# Patient Record
Sex: MALE | Race: WHITE | NOT HISPANIC OR LATINO | Employment: OTHER | URBAN - METROPOLITAN AREA
[De-identification: names, ages, dates, MRNs, and addresses within clinical notes are randomized per-mention and may not be internally consistent; named-entity substitution may affect disease eponyms.]

---

## 2018-09-12 ENCOUNTER — OFFICE VISIT (OUTPATIENT)
Dept: VASCULAR SURGERY | Facility: CLINIC | Age: 63
End: 2018-09-12
Payer: MEDICARE

## 2018-09-12 VITALS
WEIGHT: 135 LBS | DIASTOLIC BLOOD PRESSURE: 82 MMHG | BODY MASS INDEX: 23.92 KG/M2 | SYSTOLIC BLOOD PRESSURE: 128 MMHG | TEMPERATURE: 97.8 F | HEIGHT: 63 IN

## 2018-09-12 DIAGNOSIS — I77.9 PAOD (PERIPHERAL ARTERIAL OCCLUSIVE DISEASE) (HCC): Primary | ICD-10-CM

## 2018-09-12 PROBLEM — F17.200 TOBACCO DEPENDENCE: Status: ACTIVE | Noted: 2018-09-12

## 2018-09-12 PROCEDURE — 99204 OFFICE O/P NEW MOD 45 MIN: CPT | Performed by: SURGERY

## 2018-09-12 RX ORDER — ATORVASTATIN CALCIUM 20 MG/1
20 TABLET, FILM COATED ORAL DAILY
Qty: 60 TABLET | Refills: 3 | Status: SHIPPED | OUTPATIENT
Start: 2018-09-12

## 2018-09-12 RX ORDER — ASPIRIN 81 MG/1
81 TABLET ORAL DAILY
Qty: 90 TABLET | Refills: 5 | Status: SHIPPED | OUTPATIENT
Start: 2018-09-12

## 2018-09-12 NOTE — PROGRESS NOTES
PAOD (peripheral arterial occlusive disease) ([facility] [address] )  Alexa Blue is a 24-year-old long-time smoker who presents to the office at the request of his podiatrist for bilateral lower extremity claudication type symptoms  He states he is able to ambulate approximately 1-2 blocks  The right leg is worse than the left  He denies any rest pain  He comes to the office with no noninvasive studies  Plan: Aortoiliac and lower extremity arterial duplex with return office visit to discuss results and potential treatment options if indicated  -aspirin 81 milligrams daily  -atorvastatin 20 milligrams daily    Tobacco dependence  -counseled on the ill effects of continued smoking  Patient on interested in quitting at present time  Assessment/Plan   Diagnoses and all orders for this visit:    PAOD (peripheral arterial occlusive disease) ([facility])  -     VAS lower limb arterial duplex, complete bilateral; Future  -     VAS abdominal aorta/iliacs; complete study; Future  -     aspirin (ECOTRIN LOW STRENGTH) 81 mg EC tablet; Take 1 tablet (81 mg total) by mouth daily  -     atorvastatin (LIPITOR) 20 mg tablet; Take 1 tablet (20 mg total) by mouth daily        No chief complaint on file  Subjective   Patient ID: Idalims Funk is a 58 y o  male  Chief complaint :pt is new to our practice  Pt states he has cramping pain to bilateral legs R>L  Pt states he can walk a block or two before stopping  Pt states he has numbness and tingling to right foot  Pt denies open wounds or sores  Alexa Blue is a 24-year-old gentleman referred to our office for bilateral lower extremity lifestyle limiting claudication  He is only able to ambulate approximately 1-2 blocks  His right leg is worse than left  He smokes approximately 1 pack cigarettes per day  He denies any rest pain  There is no tissue loss  He presents to the office with no noninvasive imaging studies          The following portions of the patient's history were reviewed and updated as appropriate: allergies, current medications, past family history, past medical history, past social history, past surgical history and problem list     Review of Systems   Constitutional: Positive for chills and diaphoresis  HENT: Positive for ear discharge, mouth sores and postnasal drip  Eyes: Negative  Respiratory: Positive for cough and wheezing  Cardiovascular: Positive for leg swelling  Painful veins   Gastrointestinal: Negative  Endocrine: Negative  Genitourinary: Negative  Musculoskeletal: Positive for back pain, gait problem, joint swelling, myalgias, neck pain and neck stiffness  Leg pain  Leg cramping     Skin: Positive for color change and rash  Allergic/Immunologic: Negative  Hematological: Negative  Psychiatric/Behavioral: Negative  Patient Active Problem List   Diagnosis    PAOD (peripheral arterial occlusive disease) ([facility] [address] )    Tobacco dependence       Past Surgical History:   Procedure Laterality Date    SHOULDER SURGERY         Family History   Problem Relation Age of Onset    No Known Problems Mother     No Known Problems Father        Social History     Social History    Marital status:      Spouse name: N/A    Number of children: N/A    Years of education: N/A     Occupational History    Not on file       Social History Main Topics    Smoking status: Current Every Day Smoker    Smokeless tobacco: Former User    Alcohol use Not on file    Drug use: Unknown    Sexual activity: Not on file     Other Topics Concern    Not on file     Social History Narrative    No narrative on file       No Known Allergies      Current Outpatient Prescriptions:     aspirin (ECOTRIN LOW STRENGTH) 81 mg EC tablet, Take 1 tablet (81 mg total) by mouth daily, Disp: 90 tablet, Rfl: 5    atorvastatin (LIPITOR) 20 mg tablet, Take 1 tablet (20 mg total) by mouth daily, Disp: 60 tablet, Rfl: 3    Objective     Physical Exam:    General appearance: alert and oriented, in no acute distress  Skin: Bilateral feet ruborous in appearance  Neurologic: Grossly normal  Head: Normocephalic, without obvious abnormality, atraumatic  Eyes: conjunctivae/corneas clear    Neck: no carotid bruit, no JVD and supple, symmetrical, trachea midline  Back: negative  Lungs: clear to auscultation bilaterally  Chest wall: no tenderness  Heart: S1, S2 normal  Abdomen: soft, non-tender; bowel sounds normal; no masses,  no organomegaly and Well-healed midline laparotomy incision  Extremities: paucity of lower leg hair growth    Pulse exam:  Radial: Right: 2+ Left[de-identified] 2+   Femoral R 1+, L nonpalp  Popliteal: Right: non-palpable Left: non-palpable  DP: Right: non-palpable Left: non-palpable  PT: Right: non-palpable Left: non-palpable

## 2018-09-12 NOTE — ASSESSMENT & PLAN NOTE
-counseled on the ill effects of continued smoking  Patient on interested in quitting at present time

## 2018-09-12 NOTE — ASSESSMENT & PLAN NOTE
Viri Velasquez is a 77-year-old long-time smoker who presents to the office at the request of his podiatrist for bilateral lower extremity claudication type symptoms  He states he is able to ambulate approximately 1-2 blocks  The right leg is worse than the left  He denies any rest pain  He comes to the office with no noninvasive studies  Plan: Aortoiliac and lower extremity arterial duplex with return office visit to discuss results and potential treatment options if indicated

## 2018-09-12 NOTE — PATIENT INSTRUCTIONS
Peripheral Artery Disease   AMBULATORY CARE:   Peripheral artery disease (PAD)  is narrow, weak, or blocked arteries  It may affect any arteries outside of your heart and brain  PAD is usually the result of a buildup of fat and cholesterol, also called plaque, along your artery walls  Inflammation, a blood clot, or abnormal cell growth could also block your arteries  PAD prevents normal blood flow to your legs and arms  You are at risk of an amputation if poor blood flow keeps wounds from healing or causes gangrene (tissue death)  Without treatment, PAD can also cause a heart attack or stroke  Common symptoms include:  Mild PAD usually does not cause symptoms  As the disease worsens over time, you may have the following:  · Pain or cramps in your leg or hip while you walk     · A numb, weak, or heavy feeling in your legs     · Dry, scaly, red, or pale skin on your legs     · Thick or brittle nails, or hair loss on your arms and legs     · Foot sores that will not heal     · Burning or aching in your feet and toes while resting (this may be worse when you lie down)  Call 911 for the following:   · You have any of the following signs of a heart attack:      ¨ Squeezing, pressure, or pain in your chest that lasts longer than 5 minutes or returns    ¨ Discomfort or pain in your back, neck, jaw, stomach, or arm     ¨ Trouble breathing    ¨ Nausea or vomiting    ¨ Lightheadedness or a sudden cold sweat, especially with chest pain or trouble breathing    · You have any of the following signs of a stroke:      ¨ Numbness or drooping on one side of your face     ¨ Weakness in an arm or leg    ¨ Confusion or difficulty speaking    ¨ Dizziness, a severe headache, or vision loss  Seek care immediately if:   · You have sores or wounds that will not heal      · You notice black or discolored skin on your arm or leg  · Your skin is cool to the touch    Contact your healthcare provider if:   · You have leg pain when you walk 1/8 mile (200 meters) or less, even with treatment  · Your legs are red, dry, or pale, even with treatment  · You have questions or concerns about your condition or care  Treatment for PAD  can help reduce your risk of a heart attack, stroke, or amputation  You may need more than one of the following:  · Medicines  may be given to prevent blood clots and reduce the risk of a heart attack or stroke  You may be given medicine to help prevent your PAD from getting worse  · A supervised exercise program  helps you stay active in normal daily activities and may prevent disability  Healthcare providers will help you safely walk or do strength training exercises 3 times a week for 30 to 60 minutes  You will do this for several months, then transition to walking on your own  · Angioplasty  is a procedure to open your artery so blood can flow through normally  A thin tube called a catheter is used to insert a small balloon into your artery  The balloon is inflated to open your blocked artery, and then removed  A tube called a stent may be placed in your artery to hold it open  · Bypass surgery  is used to make a new connection to your artery with a vein from another part of your body, or an artificial graft  The vein or graft is attached to your artery above and below your blockage  This allows blood to flow around the blocked portion of your artery  Manage and prevent PAD:   · Walk for 30 to 60 minutes at least 4 times a week  Your healthcare provider may also refer you to an supervised exercise program  The program helps increase how far you can walk without pain  It also helps you stay active in normal daily activities and may prevent disability caused by PAD  · Do not smoke  Nicotine and other chemicals in cigarettes and cigars can worsen PAD  They can also increase your risk for a heart attack or stroke  Ask your healthcare provider for information if you currently smoke and need help to quit  E-cigarettes or smokeless tobacco still contain nicotine  Talk to your healthcare provider before you use these products  · Manage other health conditions  Take your medicines as directed and follow your healthcare provider's instructions if you have high blood pressure or high cholesterol  Perform foot care and check your blood sugar levels as directed if you have diabetes  · Eat heart healthy foods  Eat whole grains, fruits, and vegetables every day  Limit salt and high-fat foods  Ask your healthcare provider for more information on a heart healthy diet  Ask if you need to lose weight  Your healthcare provider can help you create a healthy weight-loss plan  Follow up with your healthcare provider as directed:  Write down your questions so you remember to ask them during your visits  © 2017 [facility] [employer] Information is for End User's use only and may not be sold, redistributed or otherwise used for commercial purposes  All illustrations and images included in CareNotes® are the copyrighted property of [employer] D [employer] M [employer] Inc  or Vincenzo Mariano  The above information is an  only  It is not intended as medical advice for individual conditions or treatments  Talk to your doctor, nurse or pharmacist before following any medical regimen to see if it is safe and effective for you

## 2018-11-07 ENCOUNTER — HOSPITAL ENCOUNTER (OUTPATIENT)
Dept: NON INVASIVE DIAGNOSTICS | Facility: CLINIC | Age: 63
Discharge: HOME | End: 2018-11-07
Payer: MEDICARE

## 2018-11-07 DIAGNOSIS — I77.9 PAOD (PERIPHERAL ARTERIAL OCCLUSIVE DISEASE) (HCC): ICD-10-CM

## 2018-11-07 PROCEDURE — 93923 UPR/LXTR ART STDY 3+ LVLS: CPT

## 2018-11-07 PROCEDURE — 93978 VASCULAR STUDY: CPT

## 2018-11-07 PROCEDURE — 93925 LOWER EXTREMITY STUDY: CPT

## 2018-11-08 PROCEDURE — 93925 LOWER EXTREMITY STUDY: CPT | Performed by: SURGERY

## 2018-11-08 PROCEDURE — 93978 VASCULAR STUDY: CPT | Performed by: SURGERY

## 2018-11-08 PROCEDURE — 93922 UPR/L XTREMITY ART 2 LEVELS: CPT | Performed by: SURGERY

## 2018-11-13 NOTE — PROGRESS NOTES
Assessment/Plan:    [facility]  Short distance bilateral lower extremity lifestyle limiting claudication  Long-time smoker 1 pack cigarettes per day  Noninvasive arterial imaging suggest left external iliac stenosis greater than 75%; bilateral SFA occlusions with right distal common femoral artery high-grade stenosis  Discussed the pathophysiology of peripheral arterial occlusive disease in the ill effects of continued smoking  Patient has no desire to quit at present time  Plan CTA of the abdomen pelvis with bilateral lower extremity runoff  Abdominal aortogram with possible left external iliac angioplasty/stent, right common femoral endarterectomy, possible right SFA recanalization  Cardiac risk assessment  Diagnoses and all orders for this visit:    PAOD (peripheral arterial occlusive disease) ([email or web address] )  -     Case request operating room: ENDARTERECTOMY ARTERIAL FEMORAL  Abdominal aortogram with possible left external iliac angioplasty/stent, right SFA recanalization; Standing  -     Type and screen; Future  -     Basic metabolic panel; Future  -     CBC and Platelet; Future  -     HEMOGLOBIN A1C W/ EAG ESTIMATION; Future  -     EKG 12 lead; Future  -     XR chest pa & lateral; Future  -     Ambulatory referral to Cardiology; Future  -     Case request operating room: ENDARTERECTOMY ARTERIAL FEMORAL  Abdominal aortogram with possible left external iliac angioplasty/stent, right SFA recanalization  -     CTA abdominal w run off wo contrast; Future    Tobacco dependence    Other orders  -     Diet NPO; Sips with meds; Standing  -     Void on call to OR; Standing  -     Insert peripheral IV; Standing  -     Place sequential compression device; Standing  -     Insert urinary catheter (In Operative Area Only);  Standing  -     ceFAZolin (ANCEF) IVPB (premix) 1,000 mg; Infuse 50 mL (1,000 mg total) into a venous catheter once   -     chlorhexidine (PERIDEX) 0 12 % oral rinse 15 mL; Swish and spit 15 mL every 12 (twelve) hours                Patient ID: Da Wasserman is a 58 y o  male  Chief complaint: Pt is here to review AOIL and ENRIQUE done 11/7/18  Pt is c/o bilateral cramping pain to legs R>L  Pt states he can walk a block or two before stopping  Pt states he has numbness and tingling to right foot  No open wounds or sores  Pt is smoking a pack a day  Pt is on asa and statin  Gene Sanon returns to the office following noninvasive arterial imaging  He was previously seen with complaints of 1-2 blocks bilateral lower extremity lifestyle limiting claudication (right leg worse than left leg)  He continues smoke 1 pack of cigarettes per day  He reports that the pain has adversely affected his overall quality of life  He is interested in pursuing treatment  The following portions of the patient's history were reviewed and updated as appropriate: allergies, current medications, past family history, past medical history, past social history, past surgical history and problem list     Review of Systems   Constitutional: Positive for chills and fever  HENT: Positive for sore throat  Eyes: Negative  Respiratory: Positive for choking, shortness of breath and wheezing  Cardiovascular: Positive for chest pain  Gastrointestinal: Negative  Endocrine: Negative  Genitourinary: Negative  Musculoskeletal: Negative  Leg pain   Leg cramping   Skin: Negative  Allergic/Immunologic: Negative  Neurological: Positive for dizziness, weakness, light-headedness and numbness  Hematological: Bruises/bleeds easily  Psychiatric/Behavioral: Negative  I have personally reviewed the ROS entered by MA and agree as documented      Objective:      /86 (BP Location: Right arm, Patient Position: Sitting, Cuff Size: Adult)   Temp (!) 96 6 °F (35 9 °C) (Tympanic)   Ht 5' 3" (1 6 m)   Wt 63 5 kg (140 lb)   BMI 24 80 kg/m²          Physical Exam Constitutional: He is oriented to person, place, and time  He appears well-developed and well-nourished  No distress  Looks much older than stated age   HENT:   Head: Normocephalic and atraumatic  Eyes: Conjunctivae and EOM are normal  No scleral icterus  Neck: Normal range of motion  Neck supple  No JVD present  No tracheal deviation present  Cardiovascular: Normal rate, regular rhythm and normal heart sounds  Pulses:       Radial pulses are 2+ on the right side, and 2+ on the left side  Femoral pulses are 1+ on the right side, and 1+ on the left side  Popliteal pulses are 0 on the right side, and 0 on the left side  Dorsalis pedis pulses are 0 on the right side, and 0 on the left side  Posterior tibial pulses are 0 on the right side, and 0 on the left side  Pulmonary/Chest: Effort normal and breath sounds normal    Abdominal: Soft  He exhibits no distension  Mass: no appreciable aortic pulstion/aneurysm  There is no tenderness  There is no rebound and no guarding  Well-healed midline/abdominal incisions/scars from prior surgeries   Musculoskeletal: Normal range of motion  He exhibits no edema  Neurological: He is alert and oriented to person, place, and time  Skin: Skin is warm and dry  There is erythema  Psychiatric: He has a normal mood and affect  His behavior is normal  Judgment and thought content normal          Imaging:  I have reviewed the imaging and the findings are:   Lower extremity arterial duplex  CONCLUSION:     Impression:  RIGHT LOWER LIMB:  The common femoral artery has monophasic waveform, suggestive of more proximal  disease  There is a >75% stenosis at the confluence of the common femoral artery and  profunda femoris artery  There is occlusion of the superficial femoral artery, with reconstitution in  the popliteal artery  Severe atherosclerotic arterial disease is noted throughout the femoropopliteal  vessels    Findings suggestive of tibioperoneal disease  Ankle/Brachial index:  74, moderate claudication range  May be unreliable due  to calcification of vessels  PVR/ PPG tracings are dampened  Metatarsal pressure/Great Toe Pressure: Unobtainable due to flat-line ppg  waveform tracings  LEFT LOWER LIMB:  The common femoral waveform is monophasic, suggestive of more proximal disease  There is occlusion of the proximal and mid superficial femoral artery, with  distal reconstitution  Severe atherosclerotic arterial disease throughout the femoropopliteal vessels  Findings suggestive of tibioperoneal diease  Ankle/Brachial index:  68, moderate claudication range  May be unreliable due  to calcification of vessels  PVR/ PPG tracings are dampened  Metatarsal pressure of  79 mm Hg  Great toe pressure of 57 mm Hg    Aortoiliac duplex:  CONCLUSION:     Impression     TECHNICALLY LIMITED STUDY     The visualized mid and distal abdominal aorta appear patent and normal in  caliber, with severe diffuse atherosclerotic arterial disease throughout  The visualized portions of the right common and external iliac arteries are  patent and normal in caliber, with severe diffuse atherosclerotic arterial  disease throughout  There is a >75% stenosis in the left distal external iliac artery  The  visualized portions of the common iliac artery are patent, with severe diffuse  atherosclerotic arterial disease throughout  Tech Note: This study was very technically difficult/limited due to severe  vessel calcifications, and abundant overlying bowel gas  The proximal aorta and  vessels could not be identified for examination

## 2018-11-14 ENCOUNTER — TELEPHONE (OUTPATIENT)
Dept: VASCULAR SURGERY | Facility: CLINIC | Age: 63
End: 2018-11-14

## 2018-11-14 ENCOUNTER — OFFICE VISIT (OUTPATIENT)
Dept: VASCULAR SURGERY | Facility: CLINIC | Age: 63
End: 2018-11-14
Payer: MEDICARE

## 2018-11-14 ENCOUNTER — APPOINTMENT (OUTPATIENT)
Dept: LAB | Facility: OTHER | Age: 63
End: 2018-11-14
Payer: MEDICARE

## 2018-11-14 ENCOUNTER — TRANSCRIBE ORDERS (OUTPATIENT)
Dept: LAB | Facility: OTHER | Age: 63
End: 2018-11-14

## 2018-11-14 VITALS
SYSTOLIC BLOOD PRESSURE: 130 MMHG | HEIGHT: 63 IN | TEMPERATURE: 96.6 F | BODY MASS INDEX: 24.8 KG/M2 | DIASTOLIC BLOOD PRESSURE: 86 MMHG | WEIGHT: 140 LBS

## 2018-11-14 DIAGNOSIS — I77.9 PAOD (PERIPHERAL ARTERIAL OCCLUSIVE DISEASE) (HCC): Primary | ICD-10-CM

## 2018-11-14 DIAGNOSIS — F17.200 TOBACCO DEPENDENCE: ICD-10-CM

## 2018-11-14 DIAGNOSIS — I77.9 PAOD (PERIPHERAL ARTERIAL OCCLUSIVE DISEASE) (HCC): ICD-10-CM

## 2018-11-14 LAB
ABO GROUP BLD: NORMAL
ANION GAP SERPL CALCULATED.3IONS-SCNC: 4 MMOL/L (ref 4–13)
BLD GP AB SCN SERPL QL: NEGATIVE
BUN SERPL-MCNC: 15 MG/DL (ref 5–25)
CALCIUM SERPL-MCNC: 8.4 MG/DL (ref 8.3–10.1)
CHLORIDE SERPL-SCNC: 105 MMOL/L (ref 100–108)
CO2 SERPL-SCNC: 25 MMOL/L (ref 21–32)
CREAT SERPL-MCNC: 0.97 MG/DL (ref 0.6–1.3)
ERYTHROCYTE [DISTWIDTH] IN BLOOD BY AUTOMATED COUNT: 14.2 % (ref 11.6–15.1)
EST. AVERAGE GLUCOSE BLD GHB EST-MCNC: 111 MG/DL
GFR SERPL CREATININE-BSD FRML MDRD: 83 ML/MIN/1.73SQ M
GLUCOSE P FAST SERPL-MCNC: 96 MG/DL (ref 65–99)
HBA1C MFR BLD: 5.5 % (ref 4.2–6.3)
HCT VFR BLD AUTO: 52.1 % (ref 36.5–49.3)
HGB BLD-MCNC: 16.2 G/DL (ref 12–17)
MCH RBC QN AUTO: 33.2 PG (ref 26.8–34.3)
MCHC RBC AUTO-ENTMCNC: 31.1 G/DL (ref 31.4–37.4)
MCV RBC AUTO: 107 FL (ref 82–98)
PLATELET # BLD AUTO: 125 THOUSANDS/UL (ref 149–390)
PMV BLD AUTO: 12.3 FL (ref 8.9–12.7)
POTASSIUM SERPL-SCNC: 5.3 MMOL/L (ref 3.5–5.3)
RBC # BLD AUTO: 4.88 MILLION/UL (ref 3.88–5.62)
RH BLD: POSITIVE
SODIUM SERPL-SCNC: 134 MMOL/L (ref 136–145)
SPECIMEN EXPIRATION DATE: NORMAL
WBC # BLD AUTO: 9.09 THOUSAND/UL (ref 4.31–10.16)

## 2018-11-14 PROCEDURE — 86850 RBC ANTIBODY SCREEN: CPT

## 2018-11-14 PROCEDURE — 83036 HEMOGLOBIN GLYCOSYLATED A1C: CPT

## 2018-11-14 PROCEDURE — 85027 COMPLETE CBC AUTOMATED: CPT

## 2018-11-14 PROCEDURE — 80048 BASIC METABOLIC PNL TOTAL CA: CPT

## 2018-11-14 PROCEDURE — 86901 BLOOD TYPING SEROLOGIC RH(D): CPT

## 2018-11-14 PROCEDURE — 86900 BLOOD TYPING SEROLOGIC ABO: CPT

## 2018-11-14 PROCEDURE — 36415 COLL VENOUS BLD VENIPUNCTURE: CPT

## 2018-11-14 PROCEDURE — 99214 OFFICE O/P EST MOD 30 MIN: CPT | Performed by: SURGERY

## 2018-11-14 RX ORDER — CHLORHEXIDINE GLUCONATE 0.12 MG/ML
15 RINSE ORAL EVERY 12 HOURS SCHEDULED
Status: CANCELLED | OUTPATIENT
Start: 2018-11-14

## 2018-11-14 NOTE — PATIENT INSTRUCTIONS
How to Stop Smoking   WHAT YOU NEED TO KNOW:   Why should I stop smoking? You will improve your health and the health of others around you if you stop smoking  Your risk for heart and lung disease, cancer, stroke, heart attack, and vision problems will also decrease  You can benefit from quitting no matter how long you have smoked  How can I prepare to stop smoking? Nicotine is a highly addictive drug found in cigarettes  Withdrawal symptoms can happen when you stop smoking and make it hard to quit  These include anxiety, depression, irritability, trouble sleeping, and increased appetite  You increase your chances of success if you prepare to quit  · Set a quit date  Elva Cline a date that is within the next 2 weeks  Do not pick a day that you think may be stressful or busy  Write down the day or [employer] it on your calender  · Tell friends and family that you plan to quit  Explain that you may have withdrawal symptoms when you try to quit  Ask them to support you  They may be able to encourage you and help reduce your stress to make it easier for you to quit  · Make a list of your reasons for quitting  Put the list somewhere you will see it every day, such as your refrigerator  You can look at the list when you have a craving  · Remove all tobacco and nicotine products from your home, car, and workplace  Also, remove anything else that will tempt you to smoke, such as lighters, matches, or ashtrays  Clean your car, home, and places at work that smell like smoke  The smell of smoke can trigger a craving  · Identify triggers that make you want to smoke  This may include activities, feelings, or people  Also write down 1 way you can deal with each of your triggers  For example, if you want to smoke as soon as you wake up, plan another activity during this time, such as exercise  · Make a plan for how you will quit    Learn about the tools that can help you quit, such as medicine, counseling, or nicotine replacement therapy  Choose at least 2 options to help you quit  What are some tools to help me stop smoking? · Counseling  from a trained healthcare provider can provide you with support and skills to quit smoking  The provider will also teach you to manage your withdrawal symptoms and cravings  You may receive counseling from one counselor, in group therapy, or through phone therapy called a quit line  · Nicotine replacement therapy (NRT)  such as nicotine patches, gum, or lozenges may help reduce your nicotine cravings  You may get these without a doctor's order  Do not use e-cigarettes or smokeless tobacco in place of cigarettes or to help you quit  They still contain nicotine  · Prescription medicines  such as nasal sprays or nicotine inhalers may help reduce your withdrawal symptoms  Other medicines may also be used to reduce your urge to smoke  Ask your healthcare provider about these medicines  You may need to start certain medicines 2 weeks before your quit date for them to work well  · Hypnosis  is a practice that helps guide you through thoughts and feelings  Hypnosis may help decrease your cravings and make you more willing to quit  · Acupuncture therapy  uses very thin needles to balance energy channels in the body  This is thought to help decrease cravings and symptoms of nicotine withdrawal      · Support groups  let you talk to others who are trying to quit or have already quit  It may be helpful to speak with others about how they quit  How can I manage my cravings? · Avoid situations, people, and places that tempt you to smoke  Go to nonsmoking places, such as libraries or restaurants  Understand what tempts you and try to avoid these things  · Keep your hands busy  Hold things such as a stress ball or pen  · Put candy or toothpicks in your mouth  Keep lollipops, sugarless gum, or toothpicks with you at all times  · Do not have alcohol or caffeine    These drinks may tempt you to smoke  Drink healthy liquids such as water or juice instead  · Reward yourself when you resist your cravings  Rewards will motivate you and help you stay positive  · Do an activity that distracts you from your craving  Examples include going for a walk, exercising, or cleaning  What should I know about weight gain after I quit? You may gain a few pounds after you quit smoking  It is healthier for you to gain a few pounds than to continue to smoke  The following can help you prevent weight gain:  · Eat healthy foods  These include fruits, vegetables, whole-grain breads, low-fat dairy products, beans, lean meats, and fish  Eat healthy snacks, such as low-fat yogurt, if you get hungry between meals  · Drink water before, during, and between meals  This will make your stomach feel full and help prevent you from overeating  Ask your healthcare provider how much liquid to drink each day and which liquids are best for you  · Exercise  Take a walk or do some kind of exercise every day  Ask your healthcare provider what exercise is right for you  This may help reduce your cravings and reduce stress  Where can I find support and more information? · [employer]  Phone: 9- 843 - [phone]  Web Address: www [employer]  CARE AGREEMENT:   You have the right to help plan your care  Learn about your health condition and how it may be treated  Discuss treatment options with your caregivers to decide what care you want to receive  You always have the right to refuse treatment  The above information is an  only  It is not intended as medical advice for individual conditions or treatments  Talk to your doctor, nurse or pharmacist before following any medical regimen to see if it is safe and effective for you  © 2017 [employer] Information is for End User's use only and may not be sold, redistributed or otherwise used for commercial purposes   All illustrations and images included in CareNotes® are the copyrighted property of A D A M , Inc  or Vincenzo Mariano

## 2018-11-14 NOTE — ASSESSMENT & PLAN NOTE
Short distance bilateral lower extremity lifestyle limiting claudication  Long-time smoker 1 pack cigarettes per day  Noninvasive arterial imaging suggest left external iliac stenosis greater than 75%; bilateral SFA occlusions with right distal common femoral artery high-grade stenosis  Discussed the pathophysiology of peripheral arterial occlusive disease in the ill effects of continued smoking  Patient has no desire to quit at present time  Plan CTA of the abdomen pelvis with bilateral lower extremity runoff  Abdominal aortogram with possible left external iliac angioplasty/stent, right common femoral endarterectomy, possible right SFA recanalization  Cardiac risk assessment

## 2018-11-14 NOTE — TELEPHONE ENCOUNTER
The patient was seen by Doctor Layo Malone on 11/14/18  The patient needs Cardiac Clearance for Abdominal aortogram with possible left external iliac angioplasty/stent, Right common femoral endarterectomy, Possible R SFA recanalization  I spoke with Sarah Larson and scheduled the patient for 11/16/18 at 3 pm to see Doctor Rylee Esteban  I placed the clearance form in their folder  Fax number is [phone]

## 2018-11-23 ENCOUNTER — HOSPITAL ENCOUNTER (OUTPATIENT)
Dept: RADIOLOGY | Facility: HOSPITAL | Age: 63
Discharge: HOME | End: 2018-11-23
Attending: SURGERY
Payer: MEDICARE

## 2018-11-23 ENCOUNTER — TRANSCRIBE ORDERS (OUTPATIENT)
Dept: LAB | Facility: HOSPITAL | Age: 63
End: 2018-11-23

## 2018-11-23 ENCOUNTER — HOSPITAL ENCOUNTER (OUTPATIENT)
Dept: CT IMAGING | Facility: HOSPITAL | Age: 63
Discharge: HOME | End: 2018-11-23
Attending: SURGERY
Payer: MEDICARE

## 2018-11-23 ENCOUNTER — TRANSCRIBE ORDERS (OUTPATIENT)
Dept: ADMINISTRATIVE | Facility: HOSPITAL | Age: 63
End: 2018-11-23

## 2018-11-23 DIAGNOSIS — I77.9 PAOD (PERIPHERAL ARTERIAL OCCLUSIVE DISEASE) (HCC): ICD-10-CM

## 2018-11-23 PROCEDURE — 75635 CT ANGIO ABDOMINAL ARTERIES: CPT

## 2018-11-23 PROCEDURE — 71046 X-RAY EXAM CHEST 2 VIEWS: CPT

## 2018-11-23 RX ADMIN — IOHEXOL 145 ML: 350 INJECTION, SOLUTION INTRAVENOUS at 17:19

## 2018-11-28 ENCOUNTER — OFFICE VISIT (OUTPATIENT)
Dept: LAB | Facility: HOSPITAL | Age: 63
End: 2018-11-28
Attending: SURGERY
Payer: MEDICARE

## 2018-11-28 DIAGNOSIS — I77.9 PAOD (PERIPHERAL ARTERIAL OCCLUSIVE DISEASE) (HCC): ICD-10-CM

## 2018-11-28 LAB
ATRIAL RATE: 74 BPM
P AXIS: 59 DEGREES
PR INTERVAL: 126 MS
QRS AXIS: 47 DEGREES
QRSD INTERVAL: 88 MS
QT INTERVAL: 406 MS
QTC INTERVAL: 450 MS
T WAVE AXIS: 34 DEGREES
VENTRICULAR RATE: 74 BPM

## 2018-11-28 PROCEDURE — 93005 ELECTROCARDIOGRAM TRACING: CPT

## 2018-11-28 PROCEDURE — 93010 ELECTROCARDIOGRAM REPORT: CPT | Performed by: INTERNAL MEDICINE

## 2018-12-13 ENCOUNTER — OFFICE VISIT (OUTPATIENT)
Dept: CARDIOLOGY CLINIC | Facility: CLINIC | Age: 63
End: 2018-12-13
Payer: MEDICARE

## 2018-12-13 VITALS
WEIGHT: 135 LBS | HEIGHT: 63 IN | DIASTOLIC BLOOD PRESSURE: 70 MMHG | SYSTOLIC BLOOD PRESSURE: 130 MMHG | HEART RATE: 68 BPM | BODY MASS INDEX: 23.92 KG/M2

## 2018-12-13 DIAGNOSIS — Z01.810 PREOPERATIVE CARDIOVASCULAR EXAMINATION: Primary | ICD-10-CM

## 2018-12-13 DIAGNOSIS — I77.9 PAOD (PERIPHERAL ARTERIAL OCCLUSIVE DISEASE) (HCC): ICD-10-CM

## 2018-12-13 DIAGNOSIS — R07.9 CHEST PAIN, UNSPECIFIED TYPE: ICD-10-CM

## 2018-12-13 DIAGNOSIS — F17.200 TOBACCO DEPENDENCE: ICD-10-CM

## 2018-12-13 PROCEDURE — 99204 OFFICE O/P NEW MOD 45 MIN: CPT | Performed by: INTERNAL MEDICINE
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
## 2019-01-07 ENCOUNTER — TRANSCRIBE ORDERS (OUTPATIENT)
Dept: ADMINISTRATIVE | Facility: HOSPITAL | Age: 64
End: 2019-01-07

## 2019-01-07 ENCOUNTER — HOSPITAL ENCOUNTER (OUTPATIENT)
Dept: NUCLEAR MEDICINE | Facility: HOSPITAL | Age: 64
Discharge: HOME | End: 2019-01-07
Attending: INTERNAL MEDICINE
Payer: MEDICARE

## 2019-01-07 ENCOUNTER — HOSPITAL ENCOUNTER (OUTPATIENT)
Dept: NON INVASIVE DIAGNOSTICS | Facility: HOSPITAL | Age: 64
Discharge: HOME | End: 2019-01-07
Attending: SURGERY
Payer: MEDICARE

## 2019-01-07 ENCOUNTER — LAB (OUTPATIENT)
Dept: LAB | Facility: HOSPITAL | Age: 64
End: 2019-01-07
Attending: SURGERY
Payer: MEDICARE

## 2019-01-07 ENCOUNTER — HOSPITAL ENCOUNTER (OUTPATIENT)
Dept: NON INVASIVE DIAGNOSTICS | Facility: HOSPITAL | Age: 64
Discharge: HOME | End: 2019-01-07
Attending: INTERNAL MEDICINE
Payer: MEDICARE

## 2019-01-07 DIAGNOSIS — R07.9 CHEST PAIN, UNSPECIFIED TYPE: ICD-10-CM

## 2019-01-07 DIAGNOSIS — I77.9 DISEASE OF ARTERY (HCC): Primary | ICD-10-CM

## 2019-01-07 DIAGNOSIS — I77.9 DISEASE OF ARTERY (HCC): ICD-10-CM

## 2019-01-07 DIAGNOSIS — Z01.810 PREOPERATIVE CARDIOVASCULAR EXAMINATION: ICD-10-CM

## 2019-01-07 DIAGNOSIS — I77.9 PAOD (PERIPHERAL ARTERIAL OCCLUSIVE DISEASE) (HCC): ICD-10-CM

## 2019-01-07 LAB
ABO GROUP BLD: NORMAL
ATRIAL RATE: 80 BPM
BLD GP AB SCN SERPL QL: NEGATIVE
CHEST PAIN STATEMENT: NORMAL
MAX DIASTOLIC BP: 70 MMHG
MAX HEART RATE: 108 BPM
MAX PREDICTED HEART RATE: 157 BPM
MAX. SYSTOLIC BP: 112 MMHG
P AXIS: 58 DEGREES
PR INTERVAL: 130 MS
PROTOCOL NAME: NORMAL
QRS AXIS: 29 DEGREES
QRSD INTERVAL: 76 MS
QT INTERVAL: 388 MS
QTC INTERVAL: 447 MS
REASON FOR TERMINATION: NORMAL
RH BLD: POSITIVE
SPECIMEN EXPIRATION DATE: NORMAL
T WAVE AXIS: 11 DEGREES
TARGET HR FORMULA: NORMAL
TIME IN EXERCISE PHASE: NORMAL
VENTRICULAR RATE: 80 BPM

## 2019-01-07 PROCEDURE — 86900 BLOOD TYPING SEROLOGIC ABO: CPT

## 2019-01-07 PROCEDURE — 93005 ELECTROCARDIOGRAM TRACING: CPT

## 2019-01-07 PROCEDURE — 78452 HT MUSCLE IMAGE SPECT MULT: CPT

## 2019-01-07 PROCEDURE — 93018 CV STRESS TEST I&R ONLY: CPT | Performed by: INTERNAL MEDICINE

## 2019-01-07 PROCEDURE — A9502 TC99M TETROFOSMIN: HCPCS

## 2019-01-07 PROCEDURE — 93017 CV STRESS TEST TRACING ONLY: CPT

## 2019-01-07 PROCEDURE — 86901 BLOOD TYPING SEROLOGIC RH(D): CPT

## 2019-01-07 PROCEDURE — 78452 HT MUSCLE IMAGE SPECT MULT: CPT | Performed by: INTERNAL MEDICINE

## 2019-01-07 PROCEDURE — 86850 RBC ANTIBODY SCREEN: CPT

## 2019-01-07 PROCEDURE — 93010 ELECTROCARDIOGRAM REPORT: CPT | Performed by: INTERNAL MEDICINE

## 2019-01-07 PROCEDURE — 93016 CV STRESS TEST SUPVJ ONLY: CPT | Performed by: INTERNAL MEDICINE

## 2019-01-07 PROCEDURE — 36415 COLL VENOUS BLD VENIPUNCTURE: CPT

## 2019-01-07 RX ADMIN — REGADENOSON 0.4 MG: 0.08 INJECTION, SOLUTION INTRAVENOUS at 09:21

## 2019-01-08 ENCOUNTER — TELEPHONE (OUTPATIENT)
Dept: CARDIOLOGY CLINIC | Facility: CLINIC | Age: 64
End: 2019-01-08

## 2019-01-08 DIAGNOSIS — R94.39 ABNORMAL CARDIOVASCULAR STRESS TEST: Primary | ICD-10-CM

## 2019-01-08 NOTE — TELEPHONE ENCOUNTER
Stress Test results reviewed  ECG changes noted  Although no perfusion defects, there is a high pretest probability  Given preoperative evaluation for high risk surgery, recommend definitive evaluation with cardiac catheterization prior to proceeding  Left message for patient regarding attempt calling again tomorrow

## 2019-01-10 ENCOUNTER — TELEPHONE (OUTPATIENT)
Dept: CARDIOLOGY CLINIC | Facility: CLINIC | Age: 64
End: 2019-01-10

## 2019-01-10 NOTE — TELEPHONE ENCOUNTER
Pt's daughter, Ander Rankin, called  Said she was returning your call regarding test results      oJcy's # [phone]

## 2019-01-10 NOTE — TELEPHONE ENCOUNTER
Spoke with patient's daughter, Norma Ivory  Her number is the number listed on chart - patient has no phone  She would bring him for procedure  Discussed results with her, she is familiar with procedure from her mother's medical history as well  Agreeable to proceed with [facility]  Can contact her to schedule  Understands blood work will be needed

## 2019-01-11 ENCOUNTER — TELEPHONE (OUTPATIENT)
Dept: CARDIOLOGY CLINIC | Facility: CLINIC | Age: 64
End: 2019-01-11

## 2019-01-11 DIAGNOSIS — Z01.810 PRE-OPERATIVE CARDIOVASCULAR EXAMINATION: Primary | ICD-10-CM

## 2019-01-15 NOTE — TELEPHONE ENCOUNTER
Cath not yet scheduled Detail Level: Detailed Quality 110: Preventive Care And Screening: Influenza Immunization: Influenza Immunization Administered during Influenza season Quality 111:Pneumonia Vaccination Status For Older Adults: Pneumococcal Vaccination Previously Received

## 2019-01-18 ENCOUNTER — APPOINTMENT (OUTPATIENT)
Dept: LAB | Facility: OTHER | Age: 64
End: 2019-01-18
Payer: MEDICARE

## 2019-01-18 ENCOUNTER — TRANSCRIBE ORDERS (OUTPATIENT)
Dept: LAB | Facility: OTHER | Age: 64
End: 2019-01-18

## 2019-01-18 DIAGNOSIS — Z01.810 PRE-OPERATIVE CARDIOVASCULAR EXAMINATION: ICD-10-CM

## 2019-01-18 PROBLEM — Z01.818 PRE-OPERATIVE CLEARANCE: Chronic | Status: ACTIVE | Noted: 2019-01-18

## 2019-01-18 LAB
ALBUMIN SERPL BCP-MCNC: 2.7 G/DL (ref 3.5–5)
ALP SERPL-CCNC: 90 U/L (ref 46–116)
ALT SERPL W P-5'-P-CCNC: 52 U/L (ref 12–78)
ANION GAP SERPL CALCULATED.3IONS-SCNC: 5 MMOL/L (ref 4–13)
AST SERPL W P-5'-P-CCNC: 53 U/L (ref 5–45)
BASOPHILS # BLD AUTO: 0.04 THOUSANDS/ΜL (ref 0–0.1)
BASOPHILS NFR BLD AUTO: 1 % (ref 0–1)
BILIRUB SERPL-MCNC: 1.11 MG/DL (ref 0.2–1)
BUN SERPL-MCNC: 15 MG/DL (ref 5–25)
CALCIUM SERPL-MCNC: 8.3 MG/DL (ref 8.3–10.1)
CHLORIDE SERPL-SCNC: 101 MMOL/L (ref 100–108)
CO2 SERPL-SCNC: 28 MMOL/L (ref 21–32)
CREAT SERPL-MCNC: 0.8 MG/DL (ref 0.6–1.3)
EOSINOPHIL # BLD AUTO: 0.23 THOUSAND/ΜL (ref 0–0.61)
EOSINOPHIL NFR BLD AUTO: 3 % (ref 0–6)
ERYTHROCYTE [DISTWIDTH] IN BLOOD BY AUTOMATED COUNT: 13.9 % (ref 11.6–15.1)
GFR SERPL CREATININE-BSD FRML MDRD: 95 ML/MIN/1.73SQ M
GLUCOSE P FAST SERPL-MCNC: 76 MG/DL (ref 65–99)
HCT VFR BLD AUTO: 49.7 % (ref 36.5–49.3)
HGB BLD-MCNC: 15.8 G/DL (ref 12–17)
IMM GRANULOCYTES # BLD AUTO: 0.04 THOUSAND/UL (ref 0–0.2)
IMM GRANULOCYTES NFR BLD AUTO: 1 % (ref 0–2)
LYMPHOCYTES # BLD AUTO: 2.8 THOUSANDS/ΜL (ref 0.6–4.47)
LYMPHOCYTES NFR BLD AUTO: 35 % (ref 14–44)
MCH RBC QN AUTO: 33.1 PG (ref 26.8–34.3)
MCHC RBC AUTO-ENTMCNC: 31.8 G/DL (ref 31.4–37.4)
MCV RBC AUTO: 104 FL (ref 82–98)
MONOCYTES # BLD AUTO: 0.9 THOUSAND/ΜL (ref 0.17–1.22)
MONOCYTES NFR BLD AUTO: 11 % (ref 4–12)
NEUTROPHILS # BLD AUTO: 3.91 THOUSANDS/ΜL (ref 1.85–7.62)
NEUTS SEG NFR BLD AUTO: 49 % (ref 43–75)
NRBC BLD AUTO-RTO: 0 /100 WBCS
PLATELET # BLD AUTO: 120 THOUSANDS/UL (ref 149–390)
PMV BLD AUTO: 12.3 FL (ref 8.9–12.7)
POTASSIUM SERPL-SCNC: 4.1 MMOL/L (ref 3.5–5.3)
PROT SERPL-MCNC: 7.4 G/DL (ref 6.4–8.2)
RBC # BLD AUTO: 4.78 MILLION/UL (ref 3.88–5.62)
SODIUM SERPL-SCNC: 134 MMOL/L (ref 136–145)
WBC # BLD AUTO: 7.92 THOUSAND/UL (ref 4.31–10.16)

## 2019-01-18 PROCEDURE — 36415 COLL VENOUS BLD VENIPUNCTURE: CPT

## 2019-01-18 PROCEDURE — 80053 COMPREHEN METABOLIC PANEL: CPT

## 2019-01-18 PROCEDURE — 85025 COMPLETE CBC W/AUTO DIFF WBC: CPT

## 2019-01-18 RX ORDER — SODIUM CHLORIDE 9 MG/ML
125 INJECTION, SOLUTION INTRAVENOUS CONTINUOUS
Status: CANCELLED | OUTPATIENT
Start: 2019-01-18

## 2019-01-18 RX ORDER — ASPIRIN 81 MG/1
324 TABLET, CHEWABLE ORAL ONCE
Status: CANCELLED | OUTPATIENT
Start: 2019-01-18 | End: 2019-01-18

## 2019-01-20 ENCOUNTER — TELEPHONE (OUTPATIENT)
Dept: INPATIENT UNIT | Facility: HOSPITAL | Age: 64
End: 2019-01-20

## 2019-01-21 ENCOUNTER — HOSPITAL ENCOUNTER (OUTPATIENT)
Dept: NON INVASIVE DIAGNOSTICS | Facility: HOSPITAL | Age: 64
Discharge: HOME | End: 2019-01-21
Attending: INTERNAL MEDICINE | Admitting: INTERNAL MEDICINE
Payer: MEDICARE

## 2019-01-21 VITALS
TEMPERATURE: 97.6 F | OXYGEN SATURATION: 97 % | HEIGHT: 66 IN | SYSTOLIC BLOOD PRESSURE: 118 MMHG | RESPIRATION RATE: 18 BRPM | BODY MASS INDEX: 21.69 KG/M2 | WEIGHT: 135 LBS | DIASTOLIC BLOOD PRESSURE: 66 MMHG | HEART RATE: 66 BPM

## 2019-01-21 DIAGNOSIS — R94.39 ABNORMAL CARDIOVASCULAR STRESS TEST: ICD-10-CM

## 2019-01-21 DIAGNOSIS — I77.9 PAOD (PERIPHERAL ARTERIAL OCCLUSIVE DISEASE) (HCC): ICD-10-CM

## 2019-01-21 LAB
ATRIAL RATE: 59 BPM
ATRIAL RATE: 68 BPM
P AXIS: 58 DEGREES
P AXIS: 91 DEGREES
PR INTERVAL: 126 MS
PR INTERVAL: 146 MS
QRS AXIS: 14 DEGREES
QRS AXIS: 49 DEGREES
QRSD INTERVAL: 74 MS
QRSD INTERVAL: 80 MS
QT INTERVAL: 434 MS
QT INTERVAL: 436 MS
QTC INTERVAL: 431 MS
QTC INTERVAL: 461 MS
T WAVE AXIS: 20 DEGREES
T WAVE AXIS: 7 DEGREES
VENTRICULAR RATE: 59 BPM
VENTRICULAR RATE: 68 BPM

## 2019-01-21 PROCEDURE — 99152 MOD SED SAME PHYS/QHP 5/>YRS: CPT | Performed by: INTERNAL MEDICINE

## 2019-01-21 PROCEDURE — 93010 ELECTROCARDIOGRAM REPORT: CPT | Performed by: INTERNAL MEDICINE

## 2019-01-21 PROCEDURE — 93454 CORONARY ARTERY ANGIO S&I: CPT | Performed by: INTERNAL MEDICINE

## 2019-01-21 PROCEDURE — C1769 GUIDE WIRE: HCPCS | Performed by: INTERNAL MEDICINE

## 2019-01-21 PROCEDURE — C1894 INTRO/SHEATH, NON-LASER: HCPCS | Performed by: INTERNAL MEDICINE

## 2019-01-21 PROCEDURE — 93005 ELECTROCARDIOGRAM TRACING: CPT

## 2019-01-21 PROCEDURE — 99153 MOD SED SAME PHYS/QHP EA: CPT | Performed by: INTERNAL MEDICINE

## 2019-01-21 RX ORDER — NITROGLYCERIN 0.4 MG/1
0.4 TABLET SUBLINGUAL
Status: DISCONTINUED | OUTPATIENT
Start: 2019-01-21 | End: 2019-01-21 | Stop reason: HOSPADM

## 2019-01-21 RX ORDER — ATORVASTATIN CALCIUM 20 MG/1
20 TABLET, FILM COATED ORAL DAILY
Status: DISCONTINUED | OUTPATIENT
Start: 2019-01-21 | End: 2019-01-21 | Stop reason: HOSPADM

## 2019-01-21 RX ORDER — ASPIRIN 81 MG/1
324 TABLET, CHEWABLE ORAL ONCE
Status: COMPLETED | OUTPATIENT
Start: 2019-01-21 | End: 2019-01-21

## 2019-01-21 RX ORDER — SODIUM CHLORIDE 9 MG/ML
100 INJECTION, SOLUTION INTRAVENOUS CONTINUOUS
Status: DISCONTINUED | OUTPATIENT
Start: 2019-01-21 | End: 2019-01-21 | Stop reason: HOSPADM

## 2019-01-21 RX ORDER — SODIUM CHLORIDE 9 MG/ML
125 INJECTION, SOLUTION INTRAVENOUS CONTINUOUS
Status: DISCONTINUED | OUTPATIENT
Start: 2019-01-21 | End: 2019-01-21

## 2019-01-21 RX ORDER — FENTANYL CITRATE 50 UG/ML
INJECTION, SOLUTION INTRAMUSCULAR; INTRAVENOUS CODE/TRAUMA/SEDATION MEDICATION
Status: COMPLETED | OUTPATIENT
Start: 2019-01-21 | End: 2019-01-21

## 2019-01-21 RX ORDER — ONDANSETRON 2 MG/ML
4 INJECTION INTRAMUSCULAR; INTRAVENOUS EVERY 8 HOURS PRN
Status: DISCONTINUED | OUTPATIENT
Start: 2019-01-21 | End: 2019-01-21 | Stop reason: HOSPADM

## 2019-01-21 RX ORDER — ASPIRIN 325 MG
TABLET ORAL
Status: DISCONTINUED
Start: 2019-01-21 | End: 2019-01-21 | Stop reason: HOSPADM

## 2019-01-21 RX ORDER — ACETAMINOPHEN 325 MG/1
650 TABLET ORAL EVERY 4 HOURS PRN
Status: DISCONTINUED | OUTPATIENT
Start: 2019-01-21 | End: 2019-01-21 | Stop reason: HOSPADM

## 2019-01-21 RX ORDER — ASPIRIN 81 MG/1
81 TABLET ORAL DAILY
Status: DISCONTINUED | OUTPATIENT
Start: 2019-01-21 | End: 2019-01-21 | Stop reason: HOSPADM

## 2019-01-21 RX ORDER — LIDOCAINE HYDROCHLORIDE 10 MG/ML
INJECTION, SOLUTION INFILTRATION; PERINEURAL CODE/TRAUMA/SEDATION MEDICATION
Status: COMPLETED | OUTPATIENT
Start: 2019-01-21 | End: 2019-01-21

## 2019-01-21 RX ORDER — MIDAZOLAM HYDROCHLORIDE 1 MG/ML
INJECTION INTRAMUSCULAR; INTRAVENOUS CODE/TRAUMA/SEDATION MEDICATION
Status: COMPLETED | OUTPATIENT
Start: 2019-01-21 | End: 2019-01-21

## 2019-01-21 RX ADMIN — MIDAZOLAM 1 MG: 1 INJECTION INTRAMUSCULAR; INTRAVENOUS at 08:20

## 2019-01-21 RX ADMIN — FENTANYL CITRATE 25 MCG: 50 INJECTION, SOLUTION INTRAMUSCULAR; INTRAVENOUS at 08:20

## 2019-01-21 RX ADMIN — SODIUM CHLORIDE 125 ML/HR: 0.9 INJECTION, SOLUTION INTRAVENOUS at 06:55

## 2019-01-21 RX ADMIN — MIDAZOLAM 1 MG: 1 INJECTION INTRAMUSCULAR; INTRAVENOUS at 08:25

## 2019-01-21 RX ADMIN — LIDOCAINE HYDROCHLORIDE 0.1 ML: 10 INJECTION, SOLUTION INFILTRATION; PERINEURAL at 08:18

## 2019-01-21 RX ADMIN — MIDAZOLAM 1 MG: 1 INJECTION INTRAMUSCULAR; INTRAVENOUS at 08:11

## 2019-01-21 RX ADMIN — ASPIRIN 324 MG: 81 TABLET, CHEWABLE ORAL at 06:55

## 2019-01-21 RX ADMIN — FENTANYL CITRATE 25 MCG: 50 INJECTION, SOLUTION INTRAMUSCULAR; INTRAVENOUS at 08:12

## 2019-01-21 RX ADMIN — IOHEXOL 50 ML: 350 INJECTION, SOLUTION INTRAVENOUS at 08:37

## 2019-01-21 RX ADMIN — LIDOCAINE HYDROCHLORIDE 2 ML: 10 INJECTION, SOLUTION INFILTRATION; PERINEURAL at 08:29

## 2019-01-21 RX ADMIN — FENTANYL CITRATE 25 MCG: 50 INJECTION, SOLUTION INTRAMUSCULAR; INTRAVENOUS at 08:25

## 2019-01-21 NOTE — H&P (VIEW-ONLY)
H&P Exam - Cardiology   Da Wasserman 61 y o  male MRN: [id number]  [facility]Bed#: [facility] Encounter: [id number]     Office cardiologist:  Dr Giovanny Hilliard    PCP:none      Assessment/Plan   PAD-with claudication- needs preop clearance  Intermittent chest pain- negative stress test  Ongoing tobacco use      History of Present Illness   HPI:  Da Wasserman is a 61 y o  male who presents with history PAD with claudication  Patient also has had with low level exercise activity and some intermittent chest discomfort  Patient had an negative stress test   Was electively admitted for cardiac catheterization for preop clearance for vascular surgery  Historical Information   Past Medical History:   Diagnosis Date    COPD (chronic obstructive pulmonary disease) ([facility] [address] )      Past Surgical History:   Procedure Laterality Date    SHOULDER SURGERY       Social History   History   Alcohol use Not on file     History   Drug use: Unknown     History   Smoking Status    Current Every Day Smoker   Smokeless Tobacco    Former User     Family History:   Family History   Problem Relation Age of Onset    Hypertension Mother     Coronary artery disease Mother     Sudden death Mother     Hyperlipidemia Mother     No Known Problems Father        Meds/Allergies   PTA meds:   Prior to Admission Medications   Prescriptions Last Dose Informant Patient Reported? Taking? albuterol (PROVENTIL HFA,VENTOLIN HFA) 90 mcg/act inhaler   Yes No   Sig: Inhale 2 puffs every 4 (four) hours as needed   aspirin (ECOTRIN LOW STRENGTH) 81 mg EC tablet   No No   Sig: Take 1 tablet (81 mg total) by mouth daily   atorvastatin (LIPITOR) 20 mg tablet   No No   Sig: Take 1 tablet (20 mg total) by mouth daily      Facility-Administered Medications: None     No Known Allergies    Review of Systems   All other systems reviewed and are negative  Physical Exam   Constitutional: He is oriented to person, place, and time   He appears well-developed and well-nourished  Neck: Neck supple  Cardiovascular: Normal rate, normal heart sounds and intact distal pulses  Pulmonary/Chest: Effort normal and breath sounds normal    Abdominal: Soft  Neurological: He is alert and oriented to person, place, and time  Skin: Skin is warm and dry     Psychiatric: His behavior is normal        EKG: Sinus rhythm

## 2019-01-21 NOTE — H&P
H&P Exam - Cardiology   Neeta Antoine 61 y o  male MRN: [id number]  [facility]/Bed#: [facility] Encounter: [id number]     Office cardiologist:  Dr Marquis Haines    PCP:none      Assessment/Plan   PAD-with claudication- needs preop clearance  Intermittent chest pain- negative stress test  Ongoing tobacco use      History of Present Illness   HPI:  Neeta Antoine is a 61 y o  male who presents with history PAD with claudication  Patient also has had with low level exercise activity and some intermittent chest discomfort  Patient had an negative stress test   Was electively admitted for cardiac catheterization for preop clearance for vascular surgery  Historical Information   Past Medical History:   Diagnosis Date    COPD (chronic obstructive pulmonary disease) ([facility] [address] )      Past Surgical History:   Procedure Laterality Date    SHOULDER SURGERY       Social History   History   Alcohol use Not on file     History   Drug use: Unknown     History   Smoking Status    Current Every Day Smoker   Smokeless Tobacco    Former User     Family History:   Family History   Problem Relation Age of Onset    Hypertension Mother     Coronary artery disease Mother     Sudden death Mother     Hyperlipidemia Mother     No Known Problems Father        Meds/Allergies   PTA meds:   Prior to Admission Medications   Prescriptions Last Dose Informant Patient Reported? Taking? albuterol (PROVENTIL HFA,VENTOLIN HFA) 90 mcg/act inhaler   Yes No   Sig: Inhale 2 puffs every 4 (four) hours as needed   aspirin (ECOTRIN LOW STRENGTH) 81 mg EC tablet   No No   Sig: Take 1 tablet (81 mg total) by mouth daily   atorvastatin (LIPITOR) 20 mg tablet   No No   Sig: Take 1 tablet (20 mg total) by mouth daily      Facility-Administered Medications: None     No Known Allergies    Review of Systems   All other systems reviewed and are negative  Physical Exam   Constitutional: He is oriented to person, place, and time   He appears well-developed and well-nourished  Neck: Neck supple  Cardiovascular: Normal rate, normal heart sounds and intact distal pulses  Pulmonary/Chest: Effort normal and breath sounds normal    Abdominal: Soft  Neurological: He is alert and oriented to person, place, and time  Skin: Skin is warm and dry     Psychiatric: His behavior is normal        EKG: Sinus rhythm

## 2019-01-24 NOTE — TELEPHONE ENCOUNTER
Pt had cath [facility]/21/19  [facility] req update on [facility]

## 2019-01-25 ENCOUNTER — TELEPHONE (OUTPATIENT)
Dept: NON INVASIVE DIAGNOSTICS | Facility: HOSPITAL | Age: 64
End: 2019-01-25

## 2019-01-25 NOTE — TELEPHONE ENCOUNTER
Cardiac catheterization without obstructive CAD  No contraindication to proceeding with vascular surgery as planned

## 2019-01-29 ENCOUNTER — TELEPHONE (OUTPATIENT)
Dept: VASCULAR SURGERY | Facility: CLINIC | Age: 64
End: 2019-01-29

## 2019-01-29 NOTE — TELEPHONE ENCOUNTER
Left message for Marsha Parra Patient's daughter to call so that we can schedule Patients surgery  LLF

## 2019-01-30 ENCOUNTER — PREP FOR PROCEDURE (OUTPATIENT)
Dept: VASCULAR SURGERY | Facility: CLINIC | Age: 64
End: 2019-01-30

## 2019-01-30 DIAGNOSIS — I77.9 PAOD (PERIPHERAL ARTERIAL OCCLUSIVE DISEASE) (HCC): Primary | ICD-10-CM

## 2019-01-30 NOTE — TELEPHONE ENCOUNTER
Patients daughter called back to schedule surgery for 2-13-19 at [facility] with Dr Stauffer patient was told to have blood work and a updated H&P completed before the surgery  Patient was told nothing to eat or drink after midnight on 2-12-19  Patient confirmed and understood instructions   LLF

## 2019-01-31 ENCOUNTER — PREP FOR PROCEDURE (OUTPATIENT)
Dept: VASCULAR SURGERY | Facility: CLINIC | Age: 64
End: 2019-01-31

## 2019-02-01 ENCOUNTER — TRANSCRIBE ORDERS (OUTPATIENT)
Dept: LAB | Facility: OTHER | Age: 64
End: 2019-02-01

## 2019-02-01 ENCOUNTER — APPOINTMENT (OUTPATIENT)
Dept: LAB | Facility: OTHER | Age: 64
End: 2019-02-01
Payer: MEDICARE

## 2019-02-01 DIAGNOSIS — I77.9 PAOD (PERIPHERAL ARTERIAL OCCLUSIVE DISEASE) (HCC): ICD-10-CM

## 2019-02-01 DIAGNOSIS — C22.0 HCC (HEPATOCELLULAR CARCINOMA) (HCC): Primary | ICD-10-CM

## 2019-02-01 LAB
ABO GROUP BLD: NORMAL
ANION GAP SERPL CALCULATED.3IONS-SCNC: 7 MMOL/L (ref 4–13)
BLD GP AB SCN SERPL QL: NEGATIVE
BUN SERPL-MCNC: 11 MG/DL (ref 5–25)
CALCIUM SERPL-MCNC: 8.6 MG/DL (ref 8.3–10.1)
CHLORIDE SERPL-SCNC: 106 MMOL/L (ref 100–108)
CO2 SERPL-SCNC: 23 MMOL/L (ref 21–32)
CREAT SERPL-MCNC: 0.84 MG/DL (ref 0.6–1.3)
EST. AVERAGE GLUCOSE BLD GHB EST-MCNC: 108 MG/DL
GFR SERPL CREATININE-BSD FRML MDRD: 93 ML/MIN/1.73SQ M
GLUCOSE P FAST SERPL-MCNC: 79 MG/DL (ref 65–99)
HBA1C MFR BLD: 5.4 % (ref 4.2–6.3)
POTASSIUM SERPL-SCNC: 4.4 MMOL/L (ref 3.5–5.3)
RH BLD: POSITIVE
SODIUM SERPL-SCNC: 136 MMOL/L (ref 136–145)
SPECIMEN EXPIRATION DATE: NORMAL

## 2019-02-01 PROCEDURE — 86900 BLOOD TYPING SEROLOGIC ABO: CPT

## 2019-02-01 PROCEDURE — 86850 RBC ANTIBODY SCREEN: CPT

## 2019-02-01 PROCEDURE — 36415 COLL VENOUS BLD VENIPUNCTURE: CPT

## 2019-02-01 PROCEDURE — 83036 HEMOGLOBIN GLYCOSYLATED A1C: CPT

## 2019-02-01 PROCEDURE — 80048 BASIC METABOLIC PNL TOTAL CA: CPT

## 2019-02-01 PROCEDURE — 86901 BLOOD TYPING SEROLOGIC RH(D): CPT

## 2019-02-12 ENCOUNTER — ANESTHESIA EVENT (OUTPATIENT)
Dept: PERIOP | Facility: HOSPITAL | Age: 64
End: 2019-02-12
Payer: MEDICARE

## 2019-02-12 ENCOUNTER — TELEPHONE (OUTPATIENT)
Dept: OTHER | Facility: OTHER | Age: 64
End: 2019-02-12

## 2019-02-12 NOTE — TELEPHONE ENCOUNTER
S/w daughter Donna Brown - told her statin ok  Unable to find in chart if pt was to hold asa, he has not, has been taking 81mg daily  Messaged Dr Ashu Weems to alert him to this and ask if ok to proceed as planned

## 2019-02-12 NOTE — TELEPHONE ENCOUNTER
Patient's daughter wanted to know if he should take the Atorvastatin and Aspirin  Called on-call RN and gave information

## 2019-02-13 ENCOUNTER — ANESTHESIA (OUTPATIENT)
Dept: PERIOP | Facility: HOSPITAL | Age: 64
End: 2019-02-13
Payer: MEDICARE

## 2019-02-13 ENCOUNTER — HOSPITAL ENCOUNTER (OUTPATIENT)
Facility: HOSPITAL | Age: 64
Setting detail: OUTPATIENT SURGERY
Discharge: HOME | End: 2019-02-13
Attending: SURGERY | Admitting: SURGERY
Payer: MEDICARE

## 2019-02-13 ENCOUNTER — APPOINTMENT (OUTPATIENT)
Dept: RADIOLOGY | Facility: HOSPITAL | Age: 64
End: 2019-02-13
Payer: MEDICARE

## 2019-02-13 VITALS
RESPIRATION RATE: 20 BRPM | OXYGEN SATURATION: 95 % | BODY MASS INDEX: 21.69 KG/M2 | DIASTOLIC BLOOD PRESSURE: 74 MMHG | HEIGHT: 66 IN | HEART RATE: 112 BPM | WEIGHT: 135 LBS | SYSTOLIC BLOOD PRESSURE: 132 MMHG | TEMPERATURE: 97.8 F

## 2019-02-13 DIAGNOSIS — I77.9 PAOD (PERIPHERAL ARTERIAL OCCLUSIVE DISEASE) (HCC): Primary | ICD-10-CM

## 2019-02-13 LAB
BASE EXCESS BLDA CALC-SCNC: -3 MMOL/L (ref -2–3)
CA-I BLD-SCNC: 0.9 MMOL/L (ref 1.12–1.32)
GLUCOSE SERPL-MCNC: 68 MG/DL (ref 65–140)
HCO3 BLDA-SCNC: 20.9 MMOL/L (ref 22–28)
HCT VFR BLD CALC: 35 % (ref 36.5–49.3)
HGB BLDA-MCNC: 11.9 G/DL (ref 12–17)
KCT BLD-ACNC: 260 SEC (ref 89–137)
KCT BLD-ACNC: 341 SEC (ref 89–137)
KCT BLD-ACNC: 361 SEC (ref 89–137)
PCO2 BLD: 22 MMOL/L (ref 21–32)
PCO2 BLD: 33.3 MM HG (ref 36–44)
PH BLD: 7.41 [PH] (ref 7.35–7.45)
PO2 BLD: 128 MM HG (ref 75–129)
POTASSIUM BLD-SCNC: 3.2 MMOL/L (ref 3.5–5.3)
SAO2 % BLD FROM PO2: 99 % (ref 95–98)
SODIUM BLD-SCNC: 144 MMOL/L (ref 136–145)
SPECIMEN SOURCE: ABNORMAL

## 2019-02-13 PROCEDURE — C1769 GUIDE WIRE: HCPCS | Performed by: SURGERY

## 2019-02-13 PROCEDURE — 75710 ARTERY X-RAYS ARM/LEG: CPT

## 2019-02-13 PROCEDURE — 37226 PR REVASCULARIZE FEM/POP ARTERY,ANGIOPLASTY/STENT: CPT | Performed by: SURGERY

## 2019-02-13 PROCEDURE — C1725 CATH, TRANSLUMIN NON-LASER: HCPCS | Performed by: SURGERY

## 2019-02-13 PROCEDURE — C1876 STENT, NON-COA/NON-COV W/DEL: HCPCS | Performed by: SURGERY

## 2019-02-13 PROCEDURE — 82947 ASSAY GLUCOSE BLOOD QUANT: CPT

## 2019-02-13 PROCEDURE — 82803 BLOOD GASES ANY COMBINATION: CPT

## 2019-02-13 PROCEDURE — 75625 CONTRAST EXAM ABDOMINL AORTA: CPT

## 2019-02-13 PROCEDURE — C1894 INTRO/SHEATH, NON-LASER: HCPCS | Performed by: SURGERY

## 2019-02-13 PROCEDURE — 85014 HEMATOCRIT: CPT

## 2019-02-13 PROCEDURE — C2623 CATH, TRANSLUMIN, DRUG-COAT: HCPCS

## 2019-02-13 PROCEDURE — 84132 ASSAY OF SERUM POTASSIUM: CPT

## 2019-02-13 PROCEDURE — C1887 CATHETER, GUIDING: HCPCS | Performed by: SURGERY

## 2019-02-13 PROCEDURE — C1760 CLOSURE DEV, VASC: HCPCS | Performed by: SURGERY

## 2019-02-13 PROCEDURE — C1876 STENT, NON-COA/NON-COV W/DEL: HCPCS

## 2019-02-13 PROCEDURE — 85347 COAGULATION TIME ACTIVATED: CPT

## 2019-02-13 PROCEDURE — 37221 PR REVASCULARIZE ILIAC ARTERY,ANGIOPLASTY/STENT, INITIAL VESSEL: CPT | Performed by: SURGERY

## 2019-02-13 PROCEDURE — 82330 ASSAY OF CALCIUM: CPT

## 2019-02-13 PROCEDURE — 84295 ASSAY OF SERUM SODIUM: CPT

## 2019-02-13 DEVICE — STENT INNOVA SFA 0.035IN 6 X 150MM X 130CM: Type: IMPLANTABLE DEVICE | Site: ARTERIAL | Status: FUNCTIONAL

## 2019-02-13 DEVICE — CLOSURE DEVICE MYNX ACE 6F/7FR: Type: IMPLANTABLE DEVICE | Site: ARTERIAL | Status: FUNCTIONAL

## 2019-02-13 RX ORDER — ALBUTEROL SULFATE 2.5 MG/3ML
2.5 SOLUTION RESPIRATORY (INHALATION) ONCE AS NEEDED
Status: COMPLETED | OUTPATIENT
Start: 2019-02-13 | End: 2019-02-13

## 2019-02-13 RX ORDER — SODIUM CHLORIDE, SODIUM LACTATE, POTASSIUM CHLORIDE, CALCIUM CHLORIDE 600; 310; 30; 20 MG/100ML; MG/100ML; MG/100ML; MG/100ML
125 INJECTION, SOLUTION INTRAVENOUS CONTINUOUS
Status: DISCONTINUED | OUTPATIENT
Start: 2019-02-13 | End: 2019-02-13 | Stop reason: HOSPADM

## 2019-02-13 RX ORDER — SODIUM CHLORIDE, SODIUM LACTATE, POTASSIUM CHLORIDE, CALCIUM CHLORIDE 600; 310; 30; 20 MG/100ML; MG/100ML; MG/100ML; MG/100ML
50 INJECTION, SOLUTION INTRAVENOUS CONTINUOUS
Status: DISCONTINUED | OUTPATIENT
Start: 2019-02-13 | End: 2019-02-13 | Stop reason: HOSPADM

## 2019-02-13 RX ORDER — ONDANSETRON 2 MG/ML
INJECTION INTRAMUSCULAR; INTRAVENOUS AS NEEDED
Status: DISCONTINUED | OUTPATIENT
Start: 2019-02-13 | End: 2019-02-13 | Stop reason: SURG

## 2019-02-13 RX ORDER — GLYCOPYRROLATE 0.2 MG/ML
INJECTION INTRAMUSCULAR; INTRAVENOUS AS NEEDED
Status: DISCONTINUED | OUTPATIENT
Start: 2019-02-13 | End: 2019-02-13 | Stop reason: SURG

## 2019-02-13 RX ORDER — METOCLOPRAMIDE HYDROCHLORIDE 5 MG/ML
10 INJECTION INTRAMUSCULAR; INTRAVENOUS ONCE AS NEEDED
Status: DISCONTINUED | OUTPATIENT
Start: 2019-02-13 | End: 2019-02-13 | Stop reason: HOSPADM

## 2019-02-13 RX ORDER — SODIUM CHLORIDE, SODIUM LACTATE, POTASSIUM CHLORIDE, CALCIUM CHLORIDE 600; 310; 30; 20 MG/100ML; MG/100ML; MG/100ML; MG/100ML
INJECTION, SOLUTION INTRAVENOUS CONTINUOUS PRN
Status: DISCONTINUED | OUTPATIENT
Start: 2019-02-13 | End: 2019-02-13 | Stop reason: SURG

## 2019-02-13 RX ORDER — HEPARIN SODIUM 1000 [USP'U]/ML
INJECTION, SOLUTION INTRAVENOUS; SUBCUTANEOUS AS NEEDED
Status: DISCONTINUED | OUTPATIENT
Start: 2019-02-13 | End: 2019-02-13 | Stop reason: SURG

## 2019-02-13 RX ORDER — HYDROMORPHONE HCL/PF 1 MG/ML
0.5 SYRINGE (ML) INJECTION
Status: DISCONTINUED | OUTPATIENT
Start: 2019-02-13 | End: 2019-02-13 | Stop reason: HOSPADM

## 2019-02-13 RX ORDER — OXYCODONE HYDROCHLORIDE AND ACETAMINOPHEN 5; 325 MG/1; MG/1
1 TABLET ORAL EVERY 4 HOURS PRN
Status: DISCONTINUED | OUTPATIENT
Start: 2019-02-13 | End: 2019-02-13 | Stop reason: HOSPADM

## 2019-02-13 RX ORDER — CHLORHEXIDINE GLUCONATE 0.12 MG/ML
15 RINSE ORAL EVERY 12 HOURS SCHEDULED
Status: DISCONTINUED | OUTPATIENT
Start: 2019-02-13 | End: 2019-02-13 | Stop reason: HOSPADM

## 2019-02-13 RX ORDER — CLOPIDOGREL BISULFATE 75 MG/1
75 TABLET ORAL DAILY
Qty: 90 TABLET | Refills: 2 | Status: SHIPPED | OUTPATIENT
Start: 2019-02-13 | End: 2019-02-27

## 2019-02-13 RX ORDER — FENTANYL CITRATE 50 UG/ML
INJECTION, SOLUTION INTRAMUSCULAR; INTRAVENOUS AS NEEDED
Status: DISCONTINUED | OUTPATIENT
Start: 2019-02-13 | End: 2019-02-13 | Stop reason: SURG

## 2019-02-13 RX ORDER — POTASSIUM CHLORIDE 14.9 MG/ML
INJECTION INTRAVENOUS CONTINUOUS PRN
Status: DISCONTINUED | OUTPATIENT
Start: 2019-02-13 | End: 2019-02-13 | Stop reason: SURG

## 2019-02-13 RX ORDER — SODIUM CHLORIDE 9 MG/ML
INJECTION, SOLUTION INTRAVENOUS CONTINUOUS PRN
Status: DISCONTINUED | OUTPATIENT
Start: 2019-02-13 | End: 2019-02-13 | Stop reason: SURG

## 2019-02-13 RX ORDER — MIDAZOLAM HYDROCHLORIDE 1 MG/ML
INJECTION INTRAMUSCULAR; INTRAVENOUS AS NEEDED
Status: DISCONTINUED | OUTPATIENT
Start: 2019-02-13 | End: 2019-02-13 | Stop reason: SURG

## 2019-02-13 RX ORDER — NEOSTIGMINE METHYLSULFATE 1 MG/ML
INJECTION INTRAVENOUS AS NEEDED
Status: DISCONTINUED | OUTPATIENT
Start: 2019-02-13 | End: 2019-02-13 | Stop reason: SURG

## 2019-02-13 RX ORDER — ALBUTEROL SULFATE 90 UG/1
AEROSOL, METERED RESPIRATORY (INHALATION) AS NEEDED
Status: DISCONTINUED | OUTPATIENT
Start: 2019-02-13 | End: 2019-02-13 | Stop reason: SURG

## 2019-02-13 RX ORDER — FENTANYL CITRATE/PF 50 MCG/ML
50 SYRINGE (ML) INJECTION
Status: DISCONTINUED | OUTPATIENT
Start: 2019-02-13 | End: 2019-02-13 | Stop reason: HOSPADM

## 2019-02-13 RX ORDER — PROPOFOL 10 MG/ML
INJECTION, EMULSION INTRAVENOUS AS NEEDED
Status: DISCONTINUED | OUTPATIENT
Start: 2019-02-13 | End: 2019-02-13 | Stop reason: SURG

## 2019-02-13 RX ORDER — CLOPIDOGREL BISULFATE 75 MG/1
150 TABLET ORAL ONCE
Status: COMPLETED | OUTPATIENT
Start: 2019-02-13 | End: 2019-02-13

## 2019-02-13 RX ORDER — ONDANSETRON 2 MG/ML
4 INJECTION INTRAMUSCULAR; INTRAVENOUS ONCE AS NEEDED
Status: DISCONTINUED | OUTPATIENT
Start: 2019-02-13 | End: 2019-02-13 | Stop reason: HOSPADM

## 2019-02-13 RX ORDER — ROCURONIUM BROMIDE 10 MG/ML
INJECTION, SOLUTION INTRAVENOUS AS NEEDED
Status: DISCONTINUED | OUTPATIENT
Start: 2019-02-13 | End: 2019-02-13 | Stop reason: SURG

## 2019-02-13 RX ADMIN — FENTANYL CITRATE 25 MCG: 50 INJECTION, SOLUTION INTRAMUSCULAR; INTRAVENOUS at 11:29

## 2019-02-13 RX ADMIN — SODIUM CHLORIDE, SODIUM LACTATE, POTASSIUM CHLORIDE, AND CALCIUM CHLORIDE 125 ML/HR: .6; .31; .03; .02 INJECTION, SOLUTION INTRAVENOUS at 07:26

## 2019-02-13 RX ADMIN — CLOPIDOGREL BISULFATE 150 MG: 75 TABLET ORAL at 12:01

## 2019-02-13 RX ADMIN — ROCURONIUM BROMIDE 10 MG: 10 INJECTION INTRAVENOUS at 10:55

## 2019-02-13 RX ADMIN — GLYCOPYRROLATE 0.6 MG: 0.2 INJECTION, SOLUTION INTRAMUSCULAR; INTRAVENOUS at 11:26

## 2019-02-13 RX ADMIN — ALBUTEROL SULFATE 2 PUFF: 90 AEROSOL, METERED RESPIRATORY (INHALATION) at 07:51

## 2019-02-13 RX ADMIN — FENTANYL CITRATE 50 MCG: 50 INJECTION, SOLUTION INTRAMUSCULAR; INTRAVENOUS at 10:12

## 2019-02-13 RX ADMIN — HEPARIN SODIUM 6000 UNITS: 1000 INJECTION INTRAVENOUS; SUBCUTANEOUS at 09:51

## 2019-02-13 RX ADMIN — MIDAZOLAM 2 MG: 1 INJECTION INTRAMUSCULAR; INTRAVENOUS at 08:13

## 2019-02-13 RX ADMIN — CHLORHEXIDINE GLUCONATE 15 ML: 1.2 RINSE ORAL at 07:26

## 2019-02-13 RX ADMIN — FENTANYL CITRATE 25 MCG: 50 INJECTION, SOLUTION INTRAMUSCULAR; INTRAVENOUS at 11:14

## 2019-02-13 RX ADMIN — SODIUM CHLORIDE: 0.9 INJECTION, SOLUTION INTRAVENOUS at 08:47

## 2019-02-13 RX ADMIN — PROPOFOL 150 MG: 10 INJECTION, EMULSION INTRAVENOUS at 08:30

## 2019-02-13 RX ADMIN — SODIUM CHLORIDE 5 MG/HR: 0.9 INJECTION, SOLUTION INTRAVENOUS at 10:29

## 2019-02-13 RX ADMIN — ONDANSETRON 4 MG: 2 INJECTION INTRAMUSCULAR; INTRAVENOUS at 11:26

## 2019-02-13 RX ADMIN — POTASSIUM CHLORIDE: 200 INJECTION, SOLUTION INTRAVENOUS at 10:44

## 2019-02-13 RX ADMIN — DEXAMETHASONE SODIUM PHOSPHATE 10 MG: 10 INJECTION INTRAMUSCULAR; INTRAVENOUS at 09:15

## 2019-02-13 RX ADMIN — ROCURONIUM BROMIDE 50 MG: 10 INJECTION INTRAVENOUS at 08:30

## 2019-02-13 RX ADMIN — IPRATROPIUM BROMIDE 0.5 MG: 0.5 SOLUTION RESPIRATORY (INHALATION) at 12:01

## 2019-02-13 RX ADMIN — ROCURONIUM BROMIDE 10 MG: 10 INJECTION INTRAVENOUS at 09:52

## 2019-02-13 RX ADMIN — LIDOCAINE HYDROCHLORIDE 80 MG: 20 INJECTION, SOLUTION INTRAVENOUS at 08:29

## 2019-02-13 RX ADMIN — SODIUM CHLORIDE, SODIUM LACTATE, POTASSIUM CHLORIDE, AND CALCIUM CHLORIDE: .6; .31; .03; .02 INJECTION, SOLUTION INTRAVENOUS at 07:21

## 2019-02-13 RX ADMIN — ALBUTEROL SULFATE 2.5 MG: 2.5 SOLUTION RESPIRATORY (INHALATION) at 12:01

## 2019-02-13 RX ADMIN — FENTANYL CITRATE 100 MCG: 50 INJECTION, SOLUTION INTRAMUSCULAR; INTRAVENOUS at 08:29

## 2019-02-13 RX ADMIN — NEOSTIGMINE METHYLSULFATE 3 MG: 1 INJECTION INTRAVENOUS at 11:26

## 2019-02-13 RX ADMIN — Medication 1000 MG: at 08:42

## 2019-02-13 RX ADMIN — PHENYLEPHRINE HYDROCHLORIDE 30 MCG/MIN: 10 INJECTION INTRAVENOUS at 08:36

## 2019-02-13 NOTE — OP NOTE
OPERATIVE REPORT  PATIENT NAME: Da Wasserman    :  1955  MRN: [id number]  Pt Location: [facility] HYBRID OR ROOM 02    SURGERY DATE: 2019    Surgeon(s) and Role:     * Janet Coronel DO - Primary     * Estefany Love MD - Assisting    Preop Diagnosis:  PAOD (peripheral arterial occlusive disease) ([facility] [address] ) [I77 9]    Post-Op Diagnosis Codes:     * PAOD (peripheral arterial occlusive disease) ([facility]) [I77 9]    Procedure(s) (LRB):  ABDOMINAL AORTOGRAM WITH RIGHT LOWER LEG EXTREMITY RUNOFF, RIGHT SFA  ANGIOPLASTY WITH STENT  LEFT EXTERNAL STENT (Bilateral)    Specimen(s):  * No specimens in log *  IVF:  1300ml  Estimated Blood Loss:   30 mL  Fluoroscopy time 40 9 min  Contrast volume:  150 cc Visipaque 320    Drains:  [REMOVED] Urethral Catheter Latex 16 Fr  (Removed)   Number of days: 0       Anesthesia Type:   General    Operative Indications:  PA[facility]peripheral arterial occlusive disease) ([facility] ) [I77 9]  Gene Sanon is a 71-year-old gentleman longtime current smoker who presented to the office with complaints of disabling short distance bilateral lower extremity claudication  He underwent noninvasive imaging which suggested significant multilevel atherosclerotic occlusive disease including bilateral SFA occlusions, iliac disease, and right common femoral artery bifurcation stenosis  Abdominal aortogram with right lower extremity runoff possible intervention possible right common femoral endarterectomy was recommended  The procedure, risks, benefits, alternatives, and anticipated postop course were discussed in detail  All questions were answered to satisfaction  Patient was agreeable to proceed  Written consent was obtained  Patient brought to the hybrid operating room for the above-mentioned procedure  Operative Findings:  -bilateral renal arteries are patent   -heavily calcified infrarenal abdominal aorta as well as bilateral iliac arteries    -Moderate to high grade profunda femoris (branches x 2) origin stenoses  -Long segment R SFA occlusion with reconstitution of the above knee popliteal artery via profunda femoris/numerous robust collaterals- successfully recanalized (Std Balloon angioplasty/DCB/ stenting)  -moderate/high grade stenosis/chronic dissection of proximal L external iliac artery - status post proximal L external iliac stenting  -R CFA bifurcation balloon angioplasty (5mm x 40mm)  -Post intervention there is in line flow to foot via dorsalis pedis        Complications:   None    Procedure and Technique:  The patient was properly seen in the preop holding area  The patient's name, laterality and nature procedure verified  Patient was brought to the hybrid operating room where he was positioned supine on the OR table  After adequate induction general anesthesia patient was intubated without difficulty  A Bruno catheter was inserted under sterile conditions  A preoperative dose of antibiotics was administered  Bilateral groins were prepped and draped in usual sterile fashion  A formal time-out was performed and all were in agreement  Left common femoral artery was accessed using a micropuncture needle under real-time ultrasound guidance  A 0 018 in wire was advanced through the needle  The needle was removed over the wire and a micro sheath inserted  A Storyz wire was next advanced under fluoroscopy however met resistance in the left iliac artery  The micro sheath was exchanged out for short 5 Western Humera working sheath  Using a short comfy/Glidewire combination the left iliac stenosis/chronic dissection was successfully traversed  The wire and catheter were advanced into the infrarenal abdominal aorta  The selective catheter was exchanged out for a flush catheter  The catheter was positioned in the para visceral aorta  Standard AP aortography was performed  Next using the flushed catheter/Glidewire combination the right iliac system was selected    The wire was advanced down to the right common femoral artery  The catheter was advanced over the wire down to the distal right external iliac artery  Right lower extremity runoff using a bolus chelsea technique was performed  Please refer to the above findings  Next the patient was systemically heparinized  The Glidewire was exchanged for a Yee wire  The flushed catheter was removed over the wire  The short 5 [employer] Humera working sheath was exchanged out for a 6 Western Humera Balkan sheath  Using combination of a Glide catheter/stiff angled Glidewire the long segment right superficial femoral artery flush occlusion was successfully traversed  Intraluminal position was confirmed using aspiration of blood and contrast injection  The glide catheter was removed over the wire  Balloon angioplasty of the right superficial femoral artery was next performed using a 4 mm by 20 cm balloon  Following standard balloon angioplasty there was improved luminal gain  At this time we proceeded with drug coated balloon angioplasty using sequential 5 mm x 25 cm followed by 5 mm x 15 cm IN  PACT balloons  Following drug coated balloon angioplasty there was brisk flow through the previously occluded long SFA flush occlusion with evidence of irregularity and dissection in the mid right SFA  This segment was subsequently stented using a 6 mm x 150 mm self expanding Innova stent  Post stent dilatation performed using a 5 mm balloon  Post stent angiography demonstrated brisk flow through the stented segment with no evidence of extravasation/flow-limiting dissections  Infrapopliteal runoff was preserved via the anterior tibial filling the dorsalis pedis as well as the peroneal artery which reconstitutes the posterior tibial artery at the ankle  At this time the Balkan sheath was withdrawn to the proximal right common iliac artery  Selective imaging of the common iliac bifurcation was performed in left anterior oblique projection    No evidence of hemodynamically significant right common/external iliac stenoses noted  It was notable that the right common femoral artery bifurcation appeared to contain a moderate to high-grade stenosis specifically at the common femoral/profunda femorals branch origins  This was successfully balloon angioplastied using a 5 mm x 4 cm balloon  The sheath was further withdrawn to the proximal left common iliac artery  Selective imaging of the left iliac system in right anterior oblique projection did demonstrate significant high-grade stenosis of the proximal left external iliac artery  A 9 mm x 40 mm self expanding Epic stent was chosen  This was advanced over the wire and position of the point of maximal stenosis  This stent was successfully deployed under fluoroscopy  Stent was post dilated using a 8 mm balloon  Post stent digital subtraction imaging demonstrated brisk flow through the stented external iliac artery with no flow limiting dissection/extravasation  No additional interventions were performed  The sheaths and wires were removed  A Mynx closure device was successfully deployed with good hemostasis  We elected not to proceed with right common femoral endarterectomy/profundoplasty at this time  We will reassess patient in the office  If the symptoms are improved we will continue with surveillance noninvasive studies in 3 months  If his symptoms persist he will likely require open revascularization       I was present for the entire procedure    Patient Disposition:  PACU     SIGNATURE: Bonita Olson DO  DATE: February 13, 2019  TIME: 5:09 PM

## 2019-02-13 NOTE — INTERIM OP NOTE
ABDOMINAL AORTOGRAM WITH RIGHT LOWER LEG EXTREMITY RUNOFF, RIGHT SFA  ANGIOPLASTY WITH STENT  LEFT EXTERNAL STENT  Postoperative Note  PATIENT NAME: Suleiman Ritchie  : 1955  MRN: [id number]  [facility] HYBRID OR ROOM 02    Surgery Date: 2019    Preop Diagnosis:  PAOD (peripheral arterial occlusive disease) ([facility] [address] ) [I77 9]    Post-Op Diagnosis Codes:     * PAOD (peripheral arterial occlusive disease) ([facility] [address] ) [I77 9]    Procedure(s) (LRB):  ABDOMINAL AORTOGRAM WITH RIGHT LOWER LEG EXTREMITY RUNOFF, RIGHT SFA  ANGIOPLASTY WITH STENT   LEFT EXTERNAL STENT (Bilateral)    Surgeon(s) and Role:     * Jama Stone DO - Primary     * Trey Craig MD - Assisting    Specimens:  * No specimens in log *  IVF: 1300ml  Estimated Blood Loss:   30 mL    Anesthesia Type:   General     Findings:   -Moderate to high grade profunda femoris origin stenoses  -Long segment R SFA occlusion successfully recanalized (Std Balloon angioplasty/DCB/ stenting)  -moderate/high grade stenosis of proximal L external iliac artery - L external iliac stenting  -R CFA bifurcation balloon angioplasty  -Post intervention there is in line flow to foot via dorsalis pedis  Complications:   None immediately apparent    SIGNATURE: Diane Tripp DO   DATE: 2019   TIME: 11:52 AM

## 2019-02-13 NOTE — DISCHARGE INSTRUCTIONS
DISCHARGE INSTRUCTIONS  ARTERIOGRAM/ANGIOPLASTY/STENT    Following discharge from the hospital, you may have some questions about your procedure, your activities or your general condition  These instructions may answer some of your questions and help you adjust during the first few weeks following your operation  ACTIVITY: The evening following the procedure you should be sure someone remains with you until the next morning  Rest as much as possible, sitting, lying or reclining  Use the stairs as little as possible  The following day, limit your activity to walking  Avoid stooping or heavy lifting (no more than 30 lbs) for the first three days  Walking up steps and normal activities may be resumed as you feel ready  You should not drive a car for at least two days following discharge from the hospital  You may ride in a car  If you have any questions regarding a particular activity, please discuss with your doctor or nurse before you are discharged  DIET:  Resume your normal diet  Try to eat low fat and low cholesterol foods  Drink more liquids than usual for the next 24 hours  INCISION: Your doctor may have chosen to use a type of adhesive glue, to close your incision  The glue is used to cover the incision, assist in closure, and prevent contamination  This adhesive will darken and peel away on its own within one to two weeks  You may shower after the procedure, but do not scrub the incision  Sitting in a tub is not recommended for the two days following the procedure or if you have any open wounds  It is normal to have some bruising, swelling or mild discoloration around the incision  IF increasing redness or pain develops, call our office immediately  If present, you may remove the band-aid or steri-strips over your wound after two days  If you notice any active bleeding at the site, apply pressure to the site and call our office ([phone])      FOLLOW UP STUDIES:  Your doctor will discuss whether further treatments or follow-up studies are necessary at your first post procedure visit  PLEASE CALL THE OFFICE IF YOU HAVE ANY QUESTIONS  [phone] [facility] [phone]  [address] [facility] , [address], [place], [address]  [address], [place], [address]  [address], [place], [address]  [address] [facility], [facility], [facility], [address]    [address], [place] [address]  [address] [facility] [address]   [address] [facility], [address], [place], [address] [facility]  [facility], [address] [facility], [place]

## 2019-02-13 NOTE — ANESTHESIA POSTPROCEDURE EVALUATION
Post-Op Assessment Note    CV Status:  Stable    Pain management: adequate     Mental Status:  Awake   Hydration Status:  Stable   PONV Controlled:  Controlled   Airway Patency:  Patent   Post Op Vitals Reviewed: Yes      Staff: AnesthesiologistVINCENZO           /93 (02/13/19 1145)    Temp 98 °F (36 7 °C) (02/13/19 1145)    Pulse (!) 113 (02/13/19 1145)   Resp 20 (02/13/19 1145)    SpO2   100

## 2019-02-13 NOTE — PERIOPERATIVE NURSING NOTE
Dr Varela Job at bedside to evaluate left brachial swelling  Pressure dressing applied  Patient denies numbness/tingling in LUE  +2 left radial pulse   No further orders

## 2019-02-13 NOTE — ANESTHESIA PREPROCEDURE EVALUATION
Review of Systems/Medical History  Patient summary reviewed        Cardiovascular  Negative cardio ROS   Comment: Ef 64%  St changes with stress test  Cath no intervention  PDA 40%,  Pulmonary  Negative pulmonary ROS Smoker cigarette smoker  , Tobacco cessation counseling given , COPD ,        GI/Hepatic  Negative GI/hepatic ROS          Negative  ROS        Endo/Other  Negative endo/other ROS      GYN  Negative gynecology ROS          Hematology  Negative hematology ROS      Musculoskeletal  Negative musculoskeletal ROS        Neurology  Negative neurology ROS      Psychology   Negative psychology ROS              Physical Exam    Airway       Dental       Cardiovascular  Comment: Negative ROS,     Pulmonary      Other Findings        Anesthesia Plan  ASA Score- 3     Anesthesia Type- general with ASA Monitors  Additional Monitors: arterial line  Airway Plan: ETT  Plan Factors-    Induction- intravenous  Postoperative Plan-     Informed Consent- Anesthetic plan and risks discussed with patient  I personally reviewed this patient with the CRNA  Discussed and agreed on the Anesthesia Plan with the CRNA  Lisa Sandoval

## 2019-02-13 NOTE — PERIOPERATIVE NURSING NOTE
Left brachial arterial line removed  Patient appears to have swelling at left antecubital  Pt states he thinks it was there prior to surgery  Anesthesia paged to notify

## 2019-02-13 NOTE — ANESTHESIA PROCEDURE NOTES
Arterial Line Insertion  Performed by: Nicole Sanchez MD  Authorized by: Nicole Sanchez MD   Consent: Verbal consent obtained  Written consent obtained  Risks and benefits: risks, benefits and alternatives were discussed  Consent given by: patient  Patient understanding: patient states understanding of the procedure being performed  Patient consent: the patient's understanding of the procedure matches consent given  Procedure consent: procedure consent matches procedure scheduled  Relevant documents: relevant documents present and verified  Test results: test results available and properly labeled  Site marked: the operative site was marked  Radiology Images: Radiology Images displayed and confirmed  If images not available, report reviewed  Required items: required blood products, implants, devices, and special equipment available  Patient identity confirmed: verbally with patient and arm band  Time out: Immediately prior to procedure a "time out" was called to verify the correct patient, procedure, equipment, support staff and site/side marked as required  Preparation: Patient was prepped and draped in the usual sterile fashion    Indications: multiple ABGs and hemodynamic monitoring  Orientation:  Left  Location: brachial artery  Sedation:  Patient sedated: no    Procedure Details:  Needle gauge: 20  Number of attempts: 3    Post-procedure:  Post-procedure: dressing applied  Waveform: good waveform  Patient tolerance: Patient tolerated the procedure well with no immediate complications

## 2019-02-26 NOTE — PROGRESS NOTES
PAOD (peripheral arterial occlusive disease) ([facility] )  60 y/o male smoker with history of aortoiliac and infrainguinal arterial occlusive disease with R SFA  and high-grade L EIA stenosis w/BLE short distance claudication, s/p R SFA recanalization/stent and L EIA stent by Dr Loy Greenfield 2/13/2019  Preoperative symptoms essentially resolved  Continues to smoke 3-4 cigarettes a day  -discussed post intervention surveillance  Will obtain ENRIQUE and AOIL in 3 months with follow-up appointment in the office  -continue ASA, Plavix indefinitely  -continue statin therapy  -smoking cessation  -instructed to contact the office in the interim with any questions, concerns or new symptoms    Aortoiliac occlusive disease ([place] [address] )  -see plan as outlined    Tobacco dependence  -discussed smoking cessation and encouraged to stop smoking completely      Assessment/Plan   Diagnoses and all orders for this visit:    PAOD (peripheral arterial occlusive disease) ([facility] )  -     VAS lower limb arterial duplex, complete bilateral; Future  -     clopidogrel (PLAVIX) 75 mg tablet; Take 1 tablet (75 mg total) by mouth daily  -     VAS abdominal aorta/iliacs; complete study; Future    Aortoiliac occlusive disease ([place] [address] )    Tobacco dependence        No chief complaint on file  Subjective   Patient ID: Fritzi Ormond is a 61 y o  male  Chief complaint: Pt is here to discuss ABDOMINAL AORTOGRAM WITH RIGHT LOWER LEG EXTREMITY RUNOFF, RIGHT SFA  ANGIOPLASTY WITH STENT  LEFT EXTERNAL STENT (Bilateral Leg Lower) done 2/13/19  Pt is on asa statin and plavix       60 y/o male smoker with history of aortoiliac and infrainguinal arterial occlusive disease with R SFA  and high-grade L EIA stenosis and BLE short distance claudication, s/p R SFA recanalization/stent and L EIA stent by Dr Loy Greenfield 2/13/2019 who returns to the office for follow-up after recent angiogram   Angiogram images reviewed with Dr Loy Greenfield and demonstrated high-grade stenosis of the L EIA,  mid R SFA, proximal R profundus stenosis and 2 vessel runoff via AT, peroneal with reconstitution of the PT distally  Patient reports near resolution of his claudication symptoms  He continues to have pain in his ankle and left knee which likely is musculoskeletal   The patient denies rest pain or wounds  Patient denies left groin pain  Patient remains on aspirin, Plavix and atorvastatin  Patient continues to smoke 3-4 cigarettes daily  The following portions of the patient's history were reviewed and updated as appropriate: allergies, current medications, past family history, past medical history, past social history, past surgical history and problem list     Review of Systems   Constitutional: Negative  HENT: Negative  Eyes: Negative  Respiratory: Negative  Cardiovascular: Negative  Gastrointestinal: Positive for abdominal pain  Endocrine: Negative  Genitourinary: Negative  Musculoskeletal: Positive for back pain  Leg pain   Skin: Negative  Allergic/Immunologic: Negative  Neurological: Positive for dizziness  Hematological: Bruises/bleeds easily  Psychiatric/Behavioral: Positive for sleep disturbance  I have personally reviewed the ROS entered by MA and agree as documented  Patient Active Problem List   Diagnosis    PAOD (peripheral arterial occlusive disease) ([facility] [address] )    Tobacco dependence    Preoperative cardiovascular examination    Pre-operative clearance    Aortoiliac occlusive disease ([facility] [address] )       Past Surgical History:   Procedure Laterality Date    COLONOSCOPY      IR LOWER EXTREMITY / INTERVENTION  2/13/2019    ME THROMBOENDARTECTMY FEMORAL COMMON Bilateral 2/13/2019    Procedure: ABDOMINAL AORTOGRAM WITH RIGHT LOWER LEG EXTREMITY RUNOFF, RIGHT SFA  ANGIOPLASTY WITH STENT   LEFT EXTERNAL STENT;  Surgeon: Martín Shore DO;  Location: [facility] MAIN OR;  Service: Vascular    SHOULDER SURGERY Right        Family History   Problem Relation Age of Onset    Hypertension Mother     Coronary artery disease Mother    Dior Vickers Sudden death Mother     Hyperlipidemia Mother     No Known Problems Father        Social History     Socioeconomic History    Marital status:      Spouse name: Not on file    Number of children: Not on file    Years of education: Not on file    Highest education level: Not on file   Occupational History    Not on file   Social Needs    Financial resource strain: Not on file    Food insecurity:     Worry: Not on file     Inability: Not on file    Transportation needs:     Medical: Not on file     Non-medical: Not on file   Tobacco Use    Smoking status: Current Every Day Smoker     Packs/day: 1 00    Smokeless tobacco: Former User    Tobacco comment: advised not to smoke prior to procedure   Substance and Sexual Activity    Alcohol use: Yes     Comment: 5 to 6 quarts beer week    Drug use: No    Sexual activity: Not Currently   Lifestyle    Physical activity:     Days per week: Not on file     Minutes per session: Not on file    Stress: Not on file   Relationships    Social connections:     Talks on phone: Not on file     Gets together: Not on file     Attends [employer] service: Not on file     Active member of club or organization: Not on file     Attends meetings of clubs or organizations: Not on file     Relationship status: Not on file    Intimate partner violence:     Fear of current or ex partner: Not on file     Emotionally abused: Not on file     Physically abused: Not on file     Forced sexual activity: Not on file   Other Topics Concern    Not on file   Social History Narrative    Not on file       No Known Allergies      Current Outpatient Medications:     albuterol (PROVENTIL HFA,VENTOLIN HFA) 90 mcg/act inhaler, Inhale 2 puffs every 4 (four) hours as needed, Disp: , Rfl:     aspirin (ECOTRIN LOW STRENGTH) 81 mg EC tablet, Take 1 tablet (81 mg total) by mouth daily, Disp: 90 tablet, Rfl: 5    atorvastatin (LIPITOR) 20 mg tablet, Take 1 tablet (20 mg total) by mouth daily, Disp: 60 tablet, Rfl: 3    clopidogrel (PLAVIX) 75 mg tablet, Take 1 tablet (75 mg total) by mouth daily, Disp: 30 tablet, Rfl: 5    Objective      Imaging study:  Abdominal aortogram with right lower extremity runoff 2/13/2019:  Imaging study and images reviewed with Dr Granado [place] time office appointment today  Long segment mid SFA  with successful recanalization and stent  High-grade stenosis proximal profundus  High grade L EIA stenosis treated with PTA/stent  Two vessel runoff via AT and peroneal with reconstitution of the PT distally    Physical Exam:    General appearance: alert and oriented, in no acute distress  Skin: Skin color, texture, turgor normal  No rashes or lesions  Neurologic: Grossly normal  Head: Normocephalic, without obvious abnormality, atraumatic  Eyes: PERRL, EOMI, sclerae nonicteric  Throat: lips, mucosa, and tongue normal; teeth and gums normal  Neck: no adenopathy, no carotid bruit, no JVD, supple, symmetrical, trachea midline and thyroid not enlarged, symmetric, no tenderness/mass/nodules  Back: symmetric, no curvature  ROM normal  No CVA tenderness  Lungs: Coarse breath sounds bilateral bases  No crackles  No rhonchi  No wheezes  No rub  Chest wall: no tenderness  Heart: regular rate and rhythm, S1, S2 normal, no murmur, click, rub or gallop  Abdomen: soft, non-tender; bowel sounds normal; no masses,  no organomegaly and Nondistended  No abdominal bruits  Extremities: extremities normal, warm and well-perfused; no cyanosis, clubbing, or edema, no ulcers, gangrene or trophic changes and Mild ecchymosis left thigh    Left femoral access site clear without pulsatile mass or hematoma    Pulse exam:  Radial: Right: 2+ Left[de-identified] 2+  Femoral: Right: 2+ Left: 2+  Popliteal: Right: 1+ Left: non-palpable  DP: Right: 2+ Left: doppler signal  PT: Right: doppler signal Left: doppler signal   Doppler signals:  Right:  Monophasic PT, biphasic AT, DP, monophasic peroneal   Left:  Monophasic PT, DP, AT, peroneal

## 2019-02-27 ENCOUNTER — OFFICE VISIT (OUTPATIENT)
Dept: VASCULAR SURGERY | Facility: CLINIC | Age: 64
End: 2019-02-27
Payer: MEDICARE

## 2019-02-27 VITALS
WEIGHT: 125 LBS | HEIGHT: 69 IN | TEMPERATURE: 95 F | SYSTOLIC BLOOD PRESSURE: 110 MMHG | DIASTOLIC BLOOD PRESSURE: 78 MMHG | BODY MASS INDEX: 18.51 KG/M2

## 2019-02-27 DIAGNOSIS — I77.9 PAOD (PERIPHERAL ARTERIAL OCCLUSIVE DISEASE) (HCC): Primary | ICD-10-CM

## 2019-02-27 DIAGNOSIS — F17.200 TOBACCO DEPENDENCE: ICD-10-CM

## 2019-02-27 DIAGNOSIS — I74.09 AORTOILIAC OCCLUSIVE DISEASE (HCC): ICD-10-CM

## 2019-02-27 PROCEDURE — 99214 OFFICE O/P EST MOD 30 MIN: CPT | Performed by: PHYSICIAN ASSISTANT

## 2019-02-27 RX ORDER — CLOPIDOGREL BISULFATE 75 MG/1
75 TABLET ORAL DAILY
Qty: 30 TABLET | Refills: 5 | Status: SHIPPED | OUTPATIENT
Start: 2019-02-27

## 2019-02-27 NOTE — ASSESSMENT & PLAN NOTE
60 y/o male smoker with history of aortoiliac and infrainguinal arterial occlusive disease with R SFA  and high-grade L EIA stenosis w/BLE short distance claudication, s/p R SFA recanalization/stent and L EIA stent by Dr Sheela Plummer 2/13/2019  Preoperative symptoms essentially resolved  Continues to smoke 3-4 cigarettes a day  -discussed post intervention surveillance    Will obtain ENRIQUE and AOIL in 3 months with follow-up appointment in the office  -continue ASA, Plavix indefinitely  -continue statin therapy  -smoking cessation  -instructed to contact the office in the interim with any questions, concerns or new symptoms

## 2019-02-27 NOTE — PATIENT INSTRUCTIONS
Peripheral Artery Disease   WHAT YOU NEED TO KNOW:   What is peripheral artery disease? Peripheral artery disease (PAD) is narrow, weak, or blocked arteries  It may affect any arteries outside of your heart and brain  PAD is usually the result of a buildup of fat and cholesterol, also called plaque, along your artery walls  Inflammation, a blood clot, or abnormal cell growth could also block your arteries  PAD prevents normal blood flow to your legs and arms  You are at risk of an amputation if poor blood flow keeps wounds from healing or causes gangrene (tissue death)  Without treatment, PAD can also cause a heart attack or stroke  What increases my risk for PAD? · Smoking cigarettes     · Diabetes     · High blood pressure     · High cholesterol     · Age older than 40 years    · Heart disease or a family history of heart disease  What are the signs and symptoms of PAD? Mild PAD usually does not cause symptoms  As the disease worsens over time, you may have the following:  · Pain or cramps in your leg or hip while you walk     · A numb, weak, or heavy feeling in your legs     · Dry, scaly, red, or pale skin on your legs     · Thick or brittle nails, or hair loss on your arms and legs     · Foot sores that will not heal     · Burning or aching in your feet and toes while resting (this may be worse when you lie down)  How is PAD diagnosed? · Angiography  is a test that shows pictures of the arteries in your arms and legs  You will be given contrast liquid to help the arteries show up better on the pictures  The pictures will be taken with an MRI or CT scan  Tell the healthcare provider if you have ever had an allergic reaction to contrast liquid  Do not enter the MRI room with anything metal  Metal can cause serious injury  Tell a healthcare provider if you have any metal in or on your body      · Doppler ankle brachial index (CRYSTAL)  is a test that compares blood pressure in your ankles to blood pressure in your arms  This tells your healthcare provider how well blood is flowing through the arteries in your legs  How is PAD treated? Treatment can help reduce your risk of a heart attack, stroke, or amputation  You may need more than one of the following:  · Medicines  may be given  Your healthcare provider may give you any of the following:     ¨ Antiplatelet medicine,  such as aspirin, helps prevent blood clots and reduces the risk of a heart attack or stroke  ¨ Statin medicine  helps lower your cholesterol and prevents your PAD from getting worse  · A supervised exercise program  helps you stay active in normal daily activities and may prevent disability  Healthcare providers will help you safely walk or do strength training exercises 3 times a week for 30 to 60 minutes  You will do this for several months, then transition to walking on your own  · Angioplasty  is a procedure to open your artery so blood can flow through normally  A thin tube called a catheter is used to insert a small balloon into your artery  The balloon is inflated to open your blocked artery, and then removed  A tube called a stent may be placed in your artery to hold it open  · Bypass surgery  is used to make a new connection to your artery with a vein from another part of your body, or an artificial graft  The vein or graft is attached to your artery above and below your blockage  This allows blood to flow around the blocked portion of your artery  How can I manage PAD? · Do not smoke  Nicotine and other chemicals in cigarettes and cigars can worsen PAD  They can also increase your risk for a heart attack or stroke  Ask your healthcare provider for information if you currently smoke and need help to quit  E-cigarettes or smokeless tobacco still contain nicotine  Talk to your healthcare provider before you use these products  · Manage other health conditions  Take your medicines as directed   Follow your healthcare provider's instructions if you have high blood pressure or high cholesterol  Perform foot care and check your blood sugar levels as directed if you have diabetes  · Eat heart healthy foods  Eat whole grains, fruits, and vegetables every day  Limit salt and high-fat foods  Ask your healthcare provider for more information on a heart healthy diet  Ask if you need to lose weight  Your healthcare provider can help you create a healthy weight-loss plan  Call 911 for the following:   · You have any of the following signs of a heart attack:      ¨ Squeezing, pressure, or pain in your chest that lasts longer than 5 minutes or returns    ¨ Discomfort or pain in your back, neck, jaw, stomach, or arm     ¨ Trouble breathing    ¨ Nausea or vomiting    ¨ Lightheadedness or a sudden cold sweat, especially with chest pain or trouble breathing    · You have any of the following signs of a stroke:      ¨ Numbness or drooping on one side of your face     ¨ Weakness in an arm or leg    ¨ Confusion or difficulty speaking    ¨ Dizziness, a severe headache, or vision loss  When should I seek immediate care? · You have sores or wounds that will not heal      · You notice black or discolored skin on your arm or leg  · Your skin is cool to the touch  When should I contact my healthcare provider? · You have leg pain when you walk 1/8 mile (200 meters) or less, even with treatment  · Your legs are red, dry, or pale, even with treatment  · You have questions or concerns about your condition or care  CARE AGREEMENT:   You have the right to help plan your care  Learn about your health condition and how it may be treated  Discuss treatment options with your caregivers to decide what care you want to receive  You always have the right to refuse treatment  The above information is an  only  It is not intended as medical advice for individual conditions or treatments   Talk to your doctor, nurse or pharmacist before following any medical regimen to see if it is safe and effective for you  © 2017 [employer] Information is for End User's use only and may not be sold, redistributed or otherwise used for commercial purposes  All illustrations and images included in CareNotes® are the copyrighted property of [employer]  or [employer]     -discussed ongoing surveillance for your known arterial occlusive disease  We will schedule you for a lower extremity arterial Doppler and ultrasound of her abdomen for re-evaluation in 3 months to assess your stents  -continue aspirin, Plavix and atorvastatin  -return to office in 3 months after imaging studies for re-evaluation and discussion of results  -please contact the office in the interim with any questions, concerns or change in her symptoms

## 2023-04-15 NOTE — INTERVAL H&P NOTE
H&P reviewed  After examining the patient I find no changes in the patients condition since the H&P had been written  Plan: Abdominal aortogram with right lower extremity runoff, possible bilateral iliac stenting, possible right SFA angioplasty/stenting, possible right common femoral artery endarterectomy  Operative site marked  Above plan reviewed with patient and family    All questions answered
3

## (undated) DEVICE — 40601 PROLONGED POSITIONING SYSTEM: Brand: 40601 PROLONGED POSITIONING SYSTEM

## (undated) DEVICE — FLEXCIL HIGH PRESSURE CONTRAST INJECTION LINE: Brand: NAMIC

## (undated) DEVICE — FLEXOR, CHECK-FLO, INTRODUCER BALKIN UP & OVER, CONTRALATERAL DESIGN: Brand: FLEXOR

## (undated) DEVICE — FLUID MANAGEMENT KIT - IR

## (undated) DEVICE — ANGIOGRAPHIC CATHETER: Brand: IMAGER™ II

## (undated) DEVICE — GLOVE SRG BIOGEL 7.5

## (undated) DEVICE — GLOVE INDICATOR PI UNDERGLOVE SZ 8 BLUE

## (undated) DEVICE — BAG DECANTER

## (undated) DEVICE — RADIFOCUS TORQUE DEVICE MULTI-TORQUE VISE: Brand: RADIFOCUS TORQUE DEVICE

## (undated) DEVICE — CATH DIAG 5FR .035 65CM 6S OMMI-FLUSH

## (undated) DEVICE — CATH BAL CHARGER 4 X 200MM X 135CM

## (undated) DEVICE — CATH TEMPO AQUA 5FVER135O100CM: Brand: TEMPO AQUA

## (undated) DEVICE — CATH BAL CHARGER 5 X 150MM X 135CM

## (undated) DEVICE — PINNACLE R/O II INTRODUCER SHEATH WITH RADIOPAQUE MARKER: Brand: PINNACLE

## (undated) DEVICE — ROSEN CURVED WIRE GUIDE: Brand: ROSEN

## (undated) DEVICE — MICROPUNCTURE 501

## (undated) DEVICE — RADIFOCUS GLIDEWIRE: Brand: GLIDEWIRE

## (undated) DEVICE — Device: Brand: MEDEX

## (undated) DEVICE — INFLATION DEVICE BASIX 30ATM

## (undated) DEVICE — OLCOTT TORQUE DEVICE: Brand: OLCOTT

## (undated) DEVICE — CHLORAPREP HI-LITE 26ML ORANGE

## (undated) DEVICE — CATH TEMPO AQUA 4FVER135Â°100CM: Brand: TEMPO AQUA

## (undated) DEVICE — 3000CC GUARDIAN II: Brand: GUARDIAN

## (undated) DEVICE — CATH BAL CHARGER 5 X 40MM X 75CM

## (undated) DEVICE — NON-DEHP HIGH FLOW RATE EXTENSION SET, MALE LUER LOCK ADAPTER

## (undated) DEVICE — Device

## (undated) DEVICE — 1200CC GUARDIAN II: Brand: GUARDIAN

## (undated) DEVICE — 3M™ TEGADERM™ TRANSPARENT FILM DRESSING FRAME STYLE, 1622W, 1-3/4 IN X 1-3/4 IN (4.4 CM X 4.4 CM), 100/CT 4CT/CASE: Brand: 3M™ TEGADERM™

## (undated) DEVICE — GAUZE SPONGES,16 PLY: Brand: CURITY

## (undated) DEVICE — CATH BAL CHARGER 8 X 40MM X 75CM

## (undated) DEVICE — STERILE ICS CARDIOVASCULAR PK: Brand: CARDINAL HEALTH